# Patient Record
Sex: MALE | Race: OTHER | HISPANIC OR LATINO
[De-identification: names, ages, dates, MRNs, and addresses within clinical notes are randomized per-mention and may not be internally consistent; named-entity substitution may affect disease eponyms.]

---

## 2019-01-01 ENCOUNTER — APPOINTMENT (OUTPATIENT)
Dept: SURGERY | Facility: CLINIC | Age: 49
End: 2019-01-01
Payer: COMMERCIAL

## 2019-01-01 ENCOUNTER — APPOINTMENT (OUTPATIENT)
Dept: TRAUMA SURGERY | Facility: CLINIC | Age: 49
End: 2019-01-01
Payer: COMMERCIAL

## 2019-01-01 ENCOUNTER — OUTPATIENT (OUTPATIENT)
Dept: OUTPATIENT SERVICES | Facility: HOSPITAL | Age: 49
LOS: 1 days | End: 2019-01-01
Payer: COMMERCIAL

## 2019-01-01 ENCOUNTER — TRANSCRIPTION ENCOUNTER (OUTPATIENT)
Age: 49
End: 2019-01-01

## 2019-01-01 ENCOUNTER — APPOINTMENT (OUTPATIENT)
Dept: CT IMAGING | Facility: CLINIC | Age: 49
End: 2019-01-01
Payer: COMMERCIAL

## 2019-01-01 VITALS
OXYGEN SATURATION: 97 % | HEART RATE: 74 BPM | HEIGHT: 67 IN | WEIGHT: 190.92 LBS | RESPIRATION RATE: 16 BRPM | TEMPERATURE: 98 F | SYSTOLIC BLOOD PRESSURE: 127 MMHG | DIASTOLIC BLOOD PRESSURE: 76 MMHG

## 2019-01-01 VITALS
BODY MASS INDEX: 29.1 KG/M2 | DIASTOLIC BLOOD PRESSURE: 74 MMHG | OXYGEN SATURATION: 95 % | SYSTOLIC BLOOD PRESSURE: 128 MMHG | TEMPERATURE: 97.9 F | HEART RATE: 76 BPM | HEIGHT: 68 IN | RESPIRATION RATE: 18 BRPM | WEIGHT: 192 LBS

## 2019-01-01 DIAGNOSIS — Z00.8 ENCOUNTER FOR OTHER GENERAL EXAMINATION: ICD-10-CM

## 2019-01-01 DIAGNOSIS — Z98.890 OTHER SPECIFIED POSTPROCEDURAL STATES: Chronic | ICD-10-CM

## 2019-01-01 DIAGNOSIS — K43.9 VENTRAL HERNIA W/OUT OBSTRUCTION OR GANGRENE: ICD-10-CM

## 2019-01-01 DIAGNOSIS — K42.9 UMBILICAL HERNIA WITHOUT OBSTRUCTION OR GANGRENE: ICD-10-CM

## 2019-01-01 DIAGNOSIS — Z01.818 ENCOUNTER FOR OTHER PREPROCEDURAL EXAMINATION: ICD-10-CM

## 2019-01-01 LAB
HCT VFR BLD CALC: 44.2 % — SIGNIFICANT CHANGE UP (ref 39–50)
HGB BLD-MCNC: 14.8 G/DL — SIGNIFICANT CHANGE UP (ref 13–17)
MCHC RBC-ENTMCNC: 29.2 PG — SIGNIFICANT CHANGE UP (ref 27–34)
MCHC RBC-ENTMCNC: 33.5 GM/DL — SIGNIFICANT CHANGE UP (ref 32–36)
MCV RBC AUTO: 87.4 FL — SIGNIFICANT CHANGE UP (ref 80–100)
PLATELET # BLD AUTO: 226 K/UL — SIGNIFICANT CHANGE UP (ref 150–400)
RBC # BLD: 5.06 M/UL — SIGNIFICANT CHANGE UP (ref 4.2–5.8)
RBC # FLD: 12.6 % — SIGNIFICANT CHANGE UP (ref 10.3–14.5)
WBC # BLD: 6.1 K/UL — SIGNIFICANT CHANGE UP (ref 3.8–10.5)
WBC # FLD AUTO: 6.1 K/UL — SIGNIFICANT CHANGE UP (ref 3.8–10.5)

## 2019-01-01 PROCEDURE — 85027 COMPLETE CBC AUTOMATED: CPT

## 2019-01-01 PROCEDURE — 99201 OFFICE OUTPATIENT NEW 10 MINUTES: CPT

## 2019-01-01 PROCEDURE — 99203 OFFICE O/P NEW LOW 30 MIN: CPT

## 2019-01-01 PROCEDURE — G0463: CPT

## 2019-01-01 PROCEDURE — 74177 CT ABD & PELVIS W/CONTRAST: CPT

## 2019-01-01 PROCEDURE — 74177 CT ABD & PELVIS W/CONTRAST: CPT | Mod: 26

## 2019-01-01 RX ORDER — CEFAZOLIN SODIUM 1 G
2000 VIAL (EA) INJECTION ONCE
Refills: 0 | Status: DISCONTINUED | OUTPATIENT
Start: 2020-01-01 | End: 2020-01-01

## 2019-09-23 PROBLEM — K43.9 VENTRAL HERNIA: Status: ACTIVE | Noted: 2019-01-01

## 2019-11-26 NOTE — HISTORY OF PRESENT ILLNESS
[de-identified] : 49 year old male who was initially seen at [facility] for umbilical hernias. States that over the past 9 months the hernia has been causing increasing discomfort, particularly when lifting. 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 He denies obstructive symptoms, no N/V, tolerating diet well, having normal bowel function, denies fever/chills.\par \par Patient denies significant PMHx\par Patient has B/L knee surgeries\par \par Patient underwent a CT abdomen/pelvis which demonstrates two adjacent fat-containing umbilical hernias however the neck appears to be approximately 1.8 cm in diameter.

## 2019-11-26 NOTE — PLAN
[FreeTextEntry1] : \par - I spoke with the patient regarding the findings and diagnosis of umbilical hernia\par - I offered him umbilical hernia repair with mesh, and we spoke about the indications, risks, benefits, and alternatives of the procedure, including the use of mesh\par - The patient stated that he understood and was agreeable to surgery and gave consent, and all his questions were answered\par - Patient will follow with his PCP for preoperative workup\par - Will plan for surgery in beginning of January 2020

## 2019-11-26 NOTE — PHYSICAL EXAM
[Respiratory Effort] : normal respiratory effort [Alert] : alert [Normal Rate and Rhythm] : normal rate and rhythm [Oriented to Place] : oriented to place [Oriented to Time] : oriented to time [Oriented to Person] : oriented to person [de-identified] : NCAT [Calm] : calm [de-identified] : NAD [de-identified] : No overlying skin changes at the umbilical hernia [de-identified] : Non-reducible umbilical hernia, non-TTP at hernia, non-TTP in all quadrants, no rebound/guarding

## 2019-12-31 NOTE — H&P PST ADULT - NSICDXPASTSURGICALHX_GEN_ALL_CORE_FT
PAST SURGICAL HISTORY:  H/O right knee surgery 2004 - ACL, meniscus tear    History of left knee surgery 1992 - ACL, meniscus tear

## 2019-12-31 NOTE — H&P PST ADULT - ATTENDING COMMENTS
I spoke with the patient regarding the plans to perform an umbilical hernia repair, possible mesh. We discussed the indications, risks, benefits, and alternatives of the procedure as well as the use of the mesh. The patient stated he understood and gave consent for the procedure, and all his questions were answered.

## 2019-12-31 NOTE — H&P PST ADULT - HISTORY OF PRESENT ILLNESS
48 yo male with umbilical hernia and c/o intermittent abdominal pain presents for Open Umbilical Hernia Repair on 1/8/20.

## 2019-12-31 NOTE — H&P PST ADULT - GASTROINTESTINAL COMMENTS
umbilical hernia, occasionally painfully umbilical hernia - occasionally causes pain umbilical hernia

## 2019-12-31 NOTE — H&P PST ADULT - NS SC CAGE ALCOHOL CUT DOWN
pt presents to ED with episode of blue lips at . pt had no difficulty breathing as per mother. breathing si even and unlabored. pt acting like self as per mother. afebrile. no nasal flaring noted.; pt awake and alert
no

## 2020-01-01 ENCOUNTER — APPOINTMENT (OUTPATIENT)
Dept: SURGERY | Facility: CLINIC | Age: 50
End: 2020-01-01
Payer: COMMERCIAL

## 2020-01-01 ENCOUNTER — APPOINTMENT (OUTPATIENT)
Dept: SURGERY | Facility: HOSPITAL | Age: 50
End: 2020-01-01

## 2020-01-01 ENCOUNTER — TRANSCRIPTION ENCOUNTER (OUTPATIENT)
Age: 50
End: 2020-01-01

## 2020-01-01 ENCOUNTER — INPATIENT (INPATIENT)
Facility: HOSPITAL | Age: 50
LOS: 9 days | DRG: 871 | End: 2020-04-07
Attending: INTERNAL MEDICINE | Admitting: HOSPITALIST
Payer: COMMERCIAL

## 2020-01-01 ENCOUNTER — OUTPATIENT (OUTPATIENT)
Dept: OUTPATIENT SERVICES | Facility: HOSPITAL | Age: 50
LOS: 1 days | End: 2020-01-01
Payer: COMMERCIAL

## 2020-01-01 ENCOUNTER — RESULT REVIEW (OUTPATIENT)
Age: 50
End: 2020-01-01

## 2020-01-01 VITALS
BODY MASS INDEX: 30.13 KG/M2 | SYSTOLIC BLOOD PRESSURE: 118 MMHG | OXYGEN SATURATION: 98 % | DIASTOLIC BLOOD PRESSURE: 70 MMHG | HEART RATE: 78 BPM | WEIGHT: 192 LBS | HEIGHT: 67 IN

## 2020-01-01 VITALS
RESPIRATION RATE: 16 BRPM | DIASTOLIC BLOOD PRESSURE: 73 MMHG | HEART RATE: 69 BPM | TEMPERATURE: 97 F | SYSTOLIC BLOOD PRESSURE: 109 MMHG

## 2020-01-01 VITALS
HEIGHT: 67 IN | OXYGEN SATURATION: 98 % | DIASTOLIC BLOOD PRESSURE: 66 MMHG | WEIGHT: 190.92 LBS | TEMPERATURE: 98 F | RESPIRATION RATE: 16 BRPM | HEART RATE: 76 BPM | SYSTOLIC BLOOD PRESSURE: 119 MMHG

## 2020-01-01 VITALS
HEIGHT: 67 IN | OXYGEN SATURATION: 90 % | RESPIRATION RATE: 28 BRPM | HEART RATE: 110 BPM | DIASTOLIC BLOOD PRESSURE: 73 MMHG | SYSTOLIC BLOOD PRESSURE: 136 MMHG | TEMPERATURE: 103 F | WEIGHT: 179.9 LBS

## 2020-01-01 VITALS — OXYGEN SATURATION: 67 % | RESPIRATION RATE: 34 BRPM

## 2020-01-01 DIAGNOSIS — Z98.890 OTHER SPECIFIED POSTPROCEDURAL STATES: Chronic | ICD-10-CM

## 2020-01-01 DIAGNOSIS — B34.2 CORONAVIRUS INFECTION, UNSPECIFIED: ICD-10-CM

## 2020-01-01 DIAGNOSIS — K42.9 UMBILICAL HERNIA WITHOUT OBSTRUCTION OR GANGRENE: ICD-10-CM

## 2020-01-01 LAB
4/8 RATIO: 1.75 RATIO — SIGNIFICANT CHANGE UP (ref 0.9–3.6)
ABS CD8: 206 /UL — SIGNIFICANT CHANGE UP (ref 142–740)
ALBUMIN SERPL ELPH-MCNC: 3.3 G/DL — SIGNIFICANT CHANGE UP (ref 3.3–5)
ALBUMIN SERPL ELPH-MCNC: 3.4 G/DL — SIGNIFICANT CHANGE UP (ref 3.3–5)
ALBUMIN SERPL ELPH-MCNC: 3.5 G/DL — SIGNIFICANT CHANGE UP (ref 3.3–5)
ALBUMIN SERPL ELPH-MCNC: 3.6 G/DL — SIGNIFICANT CHANGE UP (ref 3.3–5)
ALBUMIN SERPL ELPH-MCNC: 4 G/DL — SIGNIFICANT CHANGE UP (ref 3.3–5)
ALBUMIN SERPL ELPH-MCNC: 4.1 G/DL — SIGNIFICANT CHANGE UP (ref 3.3–5)
ALP SERPL-CCNC: 169 U/L — HIGH (ref 40–120)
ALP SERPL-CCNC: 170 U/L — HIGH (ref 40–120)
ALP SERPL-CCNC: 192 U/L — HIGH (ref 40–120)
ALP SERPL-CCNC: 194 U/L — HIGH (ref 40–120)
ALP SERPL-CCNC: 227 U/L — HIGH (ref 40–120)
ALP SERPL-CCNC: 298 U/L — HIGH (ref 40–120)
ALT FLD-CCNC: 171 U/L — HIGH (ref 10–45)
ALT FLD-CCNC: 227 U/L — HIGH (ref 10–45)
ALT FLD-CCNC: 229 U/L — HIGH (ref 10–45)
ALT FLD-CCNC: 246 U/L — HIGH (ref 10–45)
ALT FLD-CCNC: 270 U/L — HIGH (ref 10–45)
ALT FLD-CCNC: 289 U/L — HIGH (ref 10–45)
AMMONIA BLD-MCNC: 72 UMOL/L — HIGH (ref 11–55)
ANION GAP SERPL CALC-SCNC: 12 MMOL/L — SIGNIFICANT CHANGE UP (ref 5–17)
ANION GAP SERPL CALC-SCNC: 12 MMOL/L — SIGNIFICANT CHANGE UP (ref 5–17)
ANION GAP SERPL CALC-SCNC: 14 MMOL/L — SIGNIFICANT CHANGE UP (ref 5–17)
ANION GAP SERPL CALC-SCNC: 14 MMOL/L — SIGNIFICANT CHANGE UP (ref 5–17)
ANION GAP SERPL CALC-SCNC: 17 MMOL/L — SIGNIFICANT CHANGE UP (ref 5–17)
ANION GAP SERPL CALC-SCNC: 19 MMOL/L — HIGH (ref 5–17)
APPEARANCE UR: CLEAR — SIGNIFICANT CHANGE UP
APTT BLD: 22.6 SEC — LOW (ref 27.5–36.3)
APTT BLD: 32.8 SEC — SIGNIFICANT CHANGE UP (ref 27.5–36.3)
AST SERPL-CCNC: 165 U/L — HIGH (ref 10–40)
AST SERPL-CCNC: 186 U/L — HIGH (ref 10–40)
AST SERPL-CCNC: 237 U/L — HIGH (ref 10–40)
AST SERPL-CCNC: 276 U/L — HIGH (ref 10–40)
AST SERPL-CCNC: 61 U/L — HIGH (ref 10–40)
AST SERPL-CCNC: 98 U/L — HIGH (ref 10–40)
BACTERIA # UR AUTO: NEGATIVE — SIGNIFICANT CHANGE UP
BASE EXCESS BLDA CALC-SCNC: 6.2 MMOL/L — HIGH (ref -2–2)
BASE EXCESS BLDA CALC-SCNC: 7.3 MMOL/L — HIGH (ref -2–2)
BASE EXCESS BLDV CALC-SCNC: 2.9 MMOL/L — HIGH (ref -2–2)
BASOPHILS # BLD AUTO: 0.01 K/UL — SIGNIFICANT CHANGE UP (ref 0–0.2)
BASOPHILS # BLD AUTO: 0.03 K/UL — SIGNIFICANT CHANGE UP (ref 0–0.2)
BASOPHILS NFR BLD AUTO: 0.1 % — SIGNIFICANT CHANGE UP (ref 0–2)
BASOPHILS NFR BLD AUTO: 0.2 % — SIGNIFICANT CHANGE UP (ref 0–2)
BASOPHILS NFR BLD AUTO: 0.2 % — SIGNIFICANT CHANGE UP (ref 0–2)
BASOPHILS NFR BLD AUTO: 0.3 % — SIGNIFICANT CHANGE UP (ref 0–2)
BILIRUB SERPL-MCNC: 0.4 MG/DL — SIGNIFICANT CHANGE UP (ref 0.2–1.2)
BILIRUB SERPL-MCNC: 0.6 MG/DL — SIGNIFICANT CHANGE UP (ref 0.2–1.2)
BILIRUB SERPL-MCNC: 0.7 MG/DL — SIGNIFICANT CHANGE UP (ref 0.2–1.2)
BILIRUB SERPL-MCNC: 0.8 MG/DL — SIGNIFICANT CHANGE UP (ref 0.2–1.2)
BILIRUB UR-MCNC: NEGATIVE — SIGNIFICANT CHANGE UP
BUN SERPL-MCNC: 16 MG/DL — SIGNIFICANT CHANGE UP (ref 7–23)
BUN SERPL-MCNC: 16 MG/DL — SIGNIFICANT CHANGE UP (ref 7–23)
BUN SERPL-MCNC: 25 MG/DL — HIGH (ref 7–23)
BUN SERPL-MCNC: 6 MG/DL — LOW (ref 7–23)
BUN SERPL-MCNC: 9 MG/DL — SIGNIFICANT CHANGE UP (ref 7–23)
BUN SERPL-MCNC: 9 MG/DL — SIGNIFICANT CHANGE UP (ref 7–23)
CA-I SERPL-SCNC: 1.12 MMOL/L — SIGNIFICANT CHANGE UP (ref 1.12–1.3)
CALCIUM SERPL-MCNC: 8.3 MG/DL — LOW (ref 8.4–10.5)
CALCIUM SERPL-MCNC: 8.4 MG/DL — SIGNIFICANT CHANGE UP (ref 8.4–10.5)
CALCIUM SERPL-MCNC: 8.4 MG/DL — SIGNIFICANT CHANGE UP (ref 8.4–10.5)
CALCIUM SERPL-MCNC: 8.7 MG/DL — SIGNIFICANT CHANGE UP (ref 8.4–10.5)
CALCIUM SERPL-MCNC: 9.2 MG/DL — SIGNIFICANT CHANGE UP (ref 8.4–10.5)
CALCIUM SERPL-MCNC: 9.2 MG/DL — SIGNIFICANT CHANGE UP (ref 8.4–10.5)
CD3 BLASTS SPEC-ACNC: 46 % — LOW (ref 59–83)
CD3 BLASTS SPEC-ACNC: 574 /UL — LOW (ref 672–1870)
CD4 %: 29 % — LOW (ref 30–62)
CD8 %: 16 % — SIGNIFICANT CHANGE UP (ref 12–36)
CHLORIDE BLDV-SCNC: 99 MMOL/L — SIGNIFICANT CHANGE UP (ref 96–108)
CHLORIDE SERPL-SCNC: 104 MMOL/L — SIGNIFICANT CHANGE UP (ref 96–108)
CHLORIDE SERPL-SCNC: 89 MMOL/L — LOW (ref 96–108)
CHLORIDE SERPL-SCNC: 90 MMOL/L — LOW (ref 96–108)
CHLORIDE SERPL-SCNC: 95 MMOL/L — LOW (ref 96–108)
CHLORIDE SERPL-SCNC: 95 MMOL/L — LOW (ref 96–108)
CHLORIDE SERPL-SCNC: 99 MMOL/L — SIGNIFICANT CHANGE UP (ref 96–108)
CK MB BLD-MCNC: 2 % — SIGNIFICANT CHANGE UP (ref 0–3.5)
CK MB CFR SERPL CALC: 2.6 NG/ML — SIGNIFICANT CHANGE UP (ref 0–6.7)
CK MB CFR SERPL CALC: 8 NG/ML — HIGH (ref 0–6.7)
CK SERPL-CCNC: 127 U/L — SIGNIFICANT CHANGE UP (ref 30–200)
CK SERPL-CCNC: 513 U/L — HIGH (ref 30–200)
CO2 BLDA-SCNC: 33 MMOL/L — HIGH (ref 22–30)
CO2 BLDA-SCNC: 35 MMOL/L — HIGH (ref 22–30)
CO2 BLDV-SCNC: 29 MMOL/L — SIGNIFICANT CHANGE UP (ref 22–30)
CO2 SERPL-SCNC: 24 MMOL/L — SIGNIFICANT CHANGE UP (ref 22–31)
CO2 SERPL-SCNC: 24 MMOL/L — SIGNIFICANT CHANGE UP (ref 22–31)
CO2 SERPL-SCNC: 25 MMOL/L — SIGNIFICANT CHANGE UP (ref 22–31)
CO2 SERPL-SCNC: 25 MMOL/L — SIGNIFICANT CHANGE UP (ref 22–31)
CO2 SERPL-SCNC: 28 MMOL/L — SIGNIFICANT CHANGE UP (ref 22–31)
CO2 SERPL-SCNC: 31 MMOL/L — SIGNIFICANT CHANGE UP (ref 22–31)
COLOR SPEC: SIGNIFICANT CHANGE UP
CREAT SERPL-MCNC: 0.62 MG/DL — SIGNIFICANT CHANGE UP (ref 0.5–1.3)
CREAT SERPL-MCNC: 0.78 MG/DL — SIGNIFICANT CHANGE UP (ref 0.5–1.3)
CREAT SERPL-MCNC: 0.79 MG/DL — SIGNIFICANT CHANGE UP (ref 0.5–1.3)
CREAT SERPL-MCNC: 0.82 MG/DL — SIGNIFICANT CHANGE UP (ref 0.5–1.3)
CREAT SERPL-MCNC: 0.83 MG/DL — SIGNIFICANT CHANGE UP (ref 0.5–1.3)
CREAT SERPL-MCNC: 0.93 MG/DL — SIGNIFICANT CHANGE UP (ref 0.5–1.3)
CRP SERPL-MCNC: 10.68 MG/DL — HIGH (ref 0–0.4)
CRP SERPL-MCNC: 16.32 MG/DL — HIGH (ref 0–0.4)
CRP SERPL-MCNC: 6.71 MG/DL — HIGH (ref 0–0.4)
CRP SERPL-MCNC: 7.19 MG/DL — HIGH (ref 0–0.4)
CULTURE RESULTS: NO GROWTH — SIGNIFICANT CHANGE UP
CULTURE RESULTS: SIGNIFICANT CHANGE UP
CULTURE RESULTS: SIGNIFICANT CHANGE UP
D DIMER BLD IA.RAPID-MCNC: 3207 NG/ML DDU — HIGH
D DIMER BLD IA.RAPID-MCNC: <150 NG/ML DDU — SIGNIFICANT CHANGE UP
D DIMER BLD IA.RAPID-MCNC: HIGH NG/ML DDU
D DIMER BLD IA.RAPID-MCNC: HIGH NG/ML DDU
DIFF PNL FLD: NEGATIVE — SIGNIFICANT CHANGE UP
EOSINOPHIL # BLD AUTO: 0 K/UL — SIGNIFICANT CHANGE UP (ref 0–0.5)
EOSINOPHIL # BLD AUTO: 0.01 K/UL — SIGNIFICANT CHANGE UP (ref 0–0.5)
EOSINOPHIL NFR BLD AUTO: 0 % — SIGNIFICANT CHANGE UP (ref 0–6)
EOSINOPHIL NFR BLD AUTO: 0.1 % — SIGNIFICANT CHANGE UP (ref 0–6)
EPI CELLS # UR: 0 /HPF — SIGNIFICANT CHANGE UP
ERYTHROCYTE [SEDIMENTATION RATE] IN BLOOD: 34 MM/HR — HIGH (ref 0–15)
FERRITIN SERPL-MCNC: 6548 NG/ML — HIGH (ref 30–400)
FERRITIN SERPL-MCNC: 8098 NG/ML — HIGH (ref 30–400)
FIBRINOGEN PPP-MCNC: 391 MG/DL — SIGNIFICANT CHANGE UP (ref 350–510)
FIBRINOGEN PPP-MCNC: 440 MG/DL — SIGNIFICANT CHANGE UP (ref 350–510)
FIBRINOGEN PPP-MCNC: 626 MG/DL — HIGH (ref 350–510)
FLU A RESULT: SIGNIFICANT CHANGE UP
FLU A RESULT: SIGNIFICANT CHANGE UP
FLUAV AG NPH QL: SIGNIFICANT CHANGE UP
FLUBV AG NPH QL: SIGNIFICANT CHANGE UP
G6PD RBC-CCNC: 11.9 U/G HGB — SIGNIFICANT CHANGE UP (ref 7–20.5)
GAS PNL BLDA: SIGNIFICANT CHANGE UP
GAS PNL BLDV: 129 MMOL/L — LOW (ref 135–145)
GAS PNL BLDV: SIGNIFICANT CHANGE UP
GLUCOSE BLDC GLUCOMTR-MCNC: 143 MG/DL — HIGH (ref 70–99)
GLUCOSE BLDC GLUCOMTR-MCNC: 195 MG/DL — HIGH (ref 70–99)
GLUCOSE BLDV-MCNC: 142 MG/DL — HIGH (ref 70–99)
GLUCOSE SERPL-MCNC: 114 MG/DL — HIGH (ref 70–99)
GLUCOSE SERPL-MCNC: 121 MG/DL — HIGH (ref 70–99)
GLUCOSE SERPL-MCNC: 137 MG/DL — HIGH (ref 70–99)
GLUCOSE SERPL-MCNC: 142 MG/DL — HIGH (ref 70–99)
GLUCOSE SERPL-MCNC: 152 MG/DL — HIGH (ref 70–99)
GLUCOSE SERPL-MCNC: 186 MG/DL — HIGH (ref 70–99)
GLUCOSE UR QL: NEGATIVE — SIGNIFICANT CHANGE UP
HCO3 BLDA-SCNC: 32 MMOL/L — HIGH (ref 21–29)
HCO3 BLDA-SCNC: 34 MMOL/L — HIGH (ref 21–29)
HCO3 BLDV-SCNC: 27 MMOL/L — SIGNIFICANT CHANGE UP (ref 21–29)
HCT VFR BLD CALC: 38.8 % — LOW (ref 39–50)
HCT VFR BLD CALC: 39.4 % — SIGNIFICANT CHANGE UP (ref 39–50)
HCT VFR BLD CALC: 40.1 % — SIGNIFICANT CHANGE UP (ref 39–50)
HCT VFR BLD CALC: 41.6 % — SIGNIFICANT CHANGE UP (ref 39–50)
HCT VFR BLD CALC: 42.4 % — SIGNIFICANT CHANGE UP (ref 39–50)
HCT VFR BLD CALC: 42.7 % — SIGNIFICANT CHANGE UP (ref 39–50)
HCT VFR BLDA CALC: 44 % — SIGNIFICANT CHANGE UP (ref 39–50)
HGB BLD CALC-MCNC: 14.2 G/DL — SIGNIFICANT CHANGE UP (ref 13–17)
HGB BLD-MCNC: 12.6 G/DL — LOW (ref 13–17)
HGB BLD-MCNC: 13.3 G/DL — SIGNIFICANT CHANGE UP (ref 13–17)
HGB BLD-MCNC: 13.5 G/DL — SIGNIFICANT CHANGE UP (ref 13–17)
HGB BLD-MCNC: 13.7 G/DL — SIGNIFICANT CHANGE UP (ref 13–17)
HGB BLD-MCNC: 13.9 G/DL — SIGNIFICANT CHANGE UP (ref 13–17)
HGB BLD-MCNC: 14.1 G/DL — SIGNIFICANT CHANGE UP (ref 13–17)
IMM GRANULOCYTES NFR BLD AUTO: 0.4 % — SIGNIFICANT CHANGE UP (ref 0–1.5)
IMM GRANULOCYTES NFR BLD AUTO: 0.8 % — SIGNIFICANT CHANGE UP (ref 0–1.5)
IMM GRANULOCYTES NFR BLD AUTO: 1.5 % — SIGNIFICANT CHANGE UP (ref 0–1.5)
IMM GRANULOCYTES NFR BLD AUTO: 1.9 % — HIGH (ref 0–1.5)
INR BLD: 1.08 RATIO — SIGNIFICANT CHANGE UP (ref 0.88–1.16)
INR BLD: 1.08 RATIO — SIGNIFICANT CHANGE UP (ref 0.88–1.16)
INR BLD: 1.14 RATIO — SIGNIFICANT CHANGE UP (ref 0.88–1.16)
KETONES UR-MCNC: NEGATIVE — SIGNIFICANT CHANGE UP
LACTATE BLDV-MCNC: 1.7 MMOL/L — SIGNIFICANT CHANGE UP (ref 0.7–2)
LDH SERPL L TO P-CCNC: 1096 U/L — HIGH (ref 50–242)
LDH SERPL L TO P-CCNC: 1219 U/L — HIGH (ref 50–242)
LDH SERPL L TO P-CCNC: 582 U/L — HIGH (ref 50–242)
LDH SERPL L TO P-CCNC: 714 U/L — HIGH (ref 50–242)
LDH SERPL L TO P-CCNC: 785 U/L — HIGH (ref 50–242)
LEGIONELLA AG UR QL: NEGATIVE — SIGNIFICANT CHANGE UP
LEUKOCYTE ESTERASE UR-ACNC: NEGATIVE — SIGNIFICANT CHANGE UP
LYMPHOCYTES # BLD AUTO: 0.67 K/UL — LOW (ref 1–3.3)
LYMPHOCYTES # BLD AUTO: 0.74 K/UL — LOW (ref 1–3.3)
LYMPHOCYTES # BLD AUTO: 0.98 K/UL — LOW (ref 1–3.3)
LYMPHOCYTES # BLD AUTO: 1.15 K/UL — SIGNIFICANT CHANGE UP (ref 1–3.3)
LYMPHOCYTES # BLD AUTO: 10.5 % — LOW (ref 13–44)
LYMPHOCYTES # BLD AUTO: 15.5 % — SIGNIFICANT CHANGE UP (ref 13–44)
LYMPHOCYTES # BLD AUTO: 23.4 % — SIGNIFICANT CHANGE UP (ref 13–44)
LYMPHOCYTES # BLD AUTO: 6.3 % — LOW (ref 13–44)
MAGNESIUM SERPL-MCNC: 2.6 MG/DL — SIGNIFICANT CHANGE UP (ref 1.6–2.6)
MAGNESIUM SERPL-MCNC: 3.1 MG/DL — HIGH (ref 1.6–2.6)
MAGNESIUM SERPL-MCNC: 3.2 MG/DL — HIGH (ref 1.6–2.6)
MCHC RBC-ENTMCNC: 28.1 PG — SIGNIFICANT CHANGE UP (ref 27–34)
MCHC RBC-ENTMCNC: 28.2 PG — SIGNIFICANT CHANGE UP (ref 27–34)
MCHC RBC-ENTMCNC: 28.3 PG — SIGNIFICANT CHANGE UP (ref 27–34)
MCHC RBC-ENTMCNC: 28.6 PG — SIGNIFICANT CHANGE UP (ref 27–34)
MCHC RBC-ENTMCNC: 28.7 PG — SIGNIFICANT CHANGE UP (ref 27–34)
MCHC RBC-ENTMCNC: 29 PG — SIGNIFICANT CHANGE UP (ref 27–34)
MCHC RBC-ENTMCNC: 32.3 GM/DL — SIGNIFICANT CHANGE UP (ref 32–36)
MCHC RBC-ENTMCNC: 32.5 GM/DL — SIGNIFICANT CHANGE UP (ref 32–36)
MCHC RBC-ENTMCNC: 33 GM/DL — SIGNIFICANT CHANGE UP (ref 32–36)
MCHC RBC-ENTMCNC: 33.4 GM/DL — SIGNIFICANT CHANGE UP (ref 32–36)
MCHC RBC-ENTMCNC: 33.7 GM/DL — SIGNIFICANT CHANGE UP (ref 32–36)
MCHC RBC-ENTMCNC: 33.8 GM/DL — SIGNIFICANT CHANGE UP (ref 32–36)
MCV RBC AUTO: 83.7 FL — SIGNIFICANT CHANGE UP (ref 80–100)
MCV RBC AUTO: 84.2 FL — SIGNIFICANT CHANGE UP (ref 80–100)
MCV RBC AUTO: 85.1 FL — SIGNIFICANT CHANGE UP (ref 80–100)
MCV RBC AUTO: 85.7 FL — SIGNIFICANT CHANGE UP (ref 80–100)
MCV RBC AUTO: 88.2 FL — SIGNIFICANT CHANGE UP (ref 80–100)
MCV RBC AUTO: 89.6 FL — SIGNIFICANT CHANGE UP (ref 80–100)
MONOCYTES # BLD AUTO: 0.16 K/UL — SIGNIFICANT CHANGE UP (ref 0–0.9)
MONOCYTES # BLD AUTO: 0.33 K/UL — SIGNIFICANT CHANGE UP (ref 0–0.9)
MONOCYTES # BLD AUTO: 0.34 K/UL — SIGNIFICANT CHANGE UP (ref 0–0.9)
MONOCYTES # BLD AUTO: 0.46 K/UL — SIGNIFICANT CHANGE UP (ref 0–0.9)
MONOCYTES NFR BLD AUTO: 3.3 % — SIGNIFICANT CHANGE UP (ref 2–14)
MONOCYTES NFR BLD AUTO: 4.4 % — SIGNIFICANT CHANGE UP (ref 2–14)
MONOCYTES NFR BLD AUTO: 4.8 % — SIGNIFICANT CHANGE UP (ref 2–14)
MONOCYTES NFR BLD AUTO: 5.2 % — SIGNIFICANT CHANGE UP (ref 2–14)
NEUTROPHILS # BLD AUTO: 3.57 K/UL — SIGNIFICANT CHANGE UP (ref 1.8–7.4)
NEUTROPHILS # BLD AUTO: 4.95 K/UL — SIGNIFICANT CHANGE UP (ref 1.8–7.4)
NEUTROPHILS # BLD AUTO: 5.8 K/UL — SIGNIFICANT CHANGE UP (ref 1.8–7.4)
NEUTROPHILS # BLD AUTO: 9.24 K/UL — HIGH (ref 1.8–7.4)
NEUTROPHILS NFR BLD AUTO: 72.7 % — SIGNIFICANT CHANGE UP (ref 43–77)
NEUTROPHILS NFR BLD AUTO: 78.3 % — HIGH (ref 43–77)
NEUTROPHILS NFR BLD AUTO: 82.7 % — HIGH (ref 43–77)
NEUTROPHILS NFR BLD AUTO: 87.4 % — HIGH (ref 43–77)
NITRITE UR-MCNC: NEGATIVE — SIGNIFICANT CHANGE UP
NRBC # BLD: 0 /100 WBCS — SIGNIFICANT CHANGE UP (ref 0–0)
PCO2 BLDA: 49 MMHG — HIGH (ref 32–46)
PCO2 BLDA: 56 MMHG — HIGH (ref 32–46)
PCO2 BLDV: 43 MMHG — SIGNIFICANT CHANGE UP (ref 35–50)
PH BLDA: 7.4 — SIGNIFICANT CHANGE UP (ref 7.35–7.45)
PH BLDA: 7.42 — SIGNIFICANT CHANGE UP (ref 7.35–7.45)
PH BLDV: 7.42 — SIGNIFICANT CHANGE UP (ref 7.35–7.45)
PH UR: 7 — SIGNIFICANT CHANGE UP (ref 5–8)
PHOSPHATE SERPL-MCNC: 2.1 MG/DL — LOW (ref 2.5–4.5)
PHOSPHATE SERPL-MCNC: 3.6 MG/DL — SIGNIFICANT CHANGE UP (ref 2.5–4.5)
PHOSPHATE SERPL-MCNC: 4.3 MG/DL — SIGNIFICANT CHANGE UP (ref 2.5–4.5)
PLATELET # BLD AUTO: 141 K/UL — LOW (ref 150–400)
PLATELET # BLD AUTO: 156 K/UL — SIGNIFICANT CHANGE UP (ref 150–400)
PLATELET # BLD AUTO: 162 K/UL — SIGNIFICANT CHANGE UP (ref 150–400)
PLATELET # BLD AUTO: 198 K/UL — SIGNIFICANT CHANGE UP (ref 150–400)
PLATELET # BLD AUTO: 249 K/UL — SIGNIFICANT CHANGE UP (ref 150–400)
PLATELET # BLD AUTO: 366 K/UL — SIGNIFICANT CHANGE UP (ref 150–400)
PO2 BLDA: 118 MMHG — HIGH (ref 74–108)
PO2 BLDA: 134 MMHG — HIGH (ref 74–108)
PO2 BLDV: 34 MMHG — SIGNIFICANT CHANGE UP (ref 25–45)
POTASSIUM BLDV-SCNC: 3.1 MMOL/L — LOW (ref 3.5–5.3)
POTASSIUM SERPL-MCNC: 3.3 MMOL/L — LOW (ref 3.5–5.3)
POTASSIUM SERPL-MCNC: 3.4 MMOL/L — LOW (ref 3.5–5.3)
POTASSIUM SERPL-MCNC: 3.7 MMOL/L — SIGNIFICANT CHANGE UP (ref 3.5–5.3)
POTASSIUM SERPL-MCNC: 3.8 MMOL/L — SIGNIFICANT CHANGE UP (ref 3.5–5.3)
POTASSIUM SERPL-MCNC: 5 MMOL/L — SIGNIFICANT CHANGE UP (ref 3.5–5.3)
POTASSIUM SERPL-MCNC: 5.3 MMOL/L — SIGNIFICANT CHANGE UP (ref 3.5–5.3)
POTASSIUM SERPL-SCNC: 3.3 MMOL/L — LOW (ref 3.5–5.3)
POTASSIUM SERPL-SCNC: 3.4 MMOL/L — LOW (ref 3.5–5.3)
POTASSIUM SERPL-SCNC: 3.7 MMOL/L — SIGNIFICANT CHANGE UP (ref 3.5–5.3)
POTASSIUM SERPL-SCNC: 3.8 MMOL/L — SIGNIFICANT CHANGE UP (ref 3.5–5.3)
POTASSIUM SERPL-SCNC: 5 MMOL/L — SIGNIFICANT CHANGE UP (ref 3.5–5.3)
POTASSIUM SERPL-SCNC: 5.3 MMOL/L — SIGNIFICANT CHANGE UP (ref 3.5–5.3)
PROCALCITONIN SERPL-MCNC: 0.14 NG/ML — HIGH (ref 0.02–0.1)
PROCALCITONIN SERPL-MCNC: 0.15 NG/ML — HIGH (ref 0.02–0.1)
PROCALCITONIN SERPL-MCNC: 0.19 NG/ML — HIGH (ref 0.02–0.1)
PROCALCITONIN SERPL-MCNC: 0.33 NG/ML — HIGH (ref 0.02–0.1)
PROCALCITONIN SERPL-MCNC: 0.34 NG/ML — HIGH (ref 0.02–0.1)
PROT SERPL-MCNC: 6.8 G/DL — SIGNIFICANT CHANGE UP (ref 6–8.3)
PROT SERPL-MCNC: 6.9 G/DL — SIGNIFICANT CHANGE UP (ref 6–8.3)
PROT SERPL-MCNC: 7.4 G/DL — SIGNIFICANT CHANGE UP (ref 6–8.3)
PROT SERPL-MCNC: 7.5 G/DL — SIGNIFICANT CHANGE UP (ref 6–8.3)
PROT SERPL-MCNC: 7.8 G/DL — SIGNIFICANT CHANGE UP (ref 6–8.3)
PROT SERPL-MCNC: 7.9 G/DL — SIGNIFICANT CHANGE UP (ref 6–8.3)
PROT UR-MCNC: ABNORMAL
PROTHROM AB SERPL-ACNC: 12.5 SEC — SIGNIFICANT CHANGE UP (ref 10–12.9)
PROTHROM AB SERPL-ACNC: 12.5 SEC — SIGNIFICANT CHANGE UP (ref 10–12.9)
PROTHROM AB SERPL-ACNC: 13.1 SEC — HIGH (ref 10–12.9)
RBC # BLD: 4.4 M/UL — SIGNIFICANT CHANGE UP (ref 4.2–5.8)
RBC # BLD: 4.63 M/UL — SIGNIFICANT CHANGE UP (ref 4.2–5.8)
RBC # BLD: 4.73 M/UL — SIGNIFICANT CHANGE UP (ref 4.2–5.8)
RBC # BLD: 4.79 M/UL — SIGNIFICANT CHANGE UP (ref 4.2–5.8)
RBC # BLD: 4.94 M/UL — SIGNIFICANT CHANGE UP (ref 4.2–5.8)
RBC # BLD: 4.98 M/UL — SIGNIFICANT CHANGE UP (ref 4.2–5.8)
RBC # FLD: 12 % — SIGNIFICANT CHANGE UP (ref 10.3–14.5)
RBC # FLD: 12.1 % — SIGNIFICANT CHANGE UP (ref 10.3–14.5)
RBC # FLD: 12.1 % — SIGNIFICANT CHANGE UP (ref 10.3–14.5)
RBC # FLD: 12.2 % — SIGNIFICANT CHANGE UP (ref 10.3–14.5)
RBC # FLD: 12.3 % — SIGNIFICANT CHANGE UP (ref 10.3–14.5)
RBC # FLD: 12.3 % — SIGNIFICANT CHANGE UP (ref 10.3–14.5)
RBC CASTS # UR COMP ASSIST: 0 /HPF — SIGNIFICANT CHANGE UP (ref 0–4)
RSV RESULT: SIGNIFICANT CHANGE UP
RSV RNA RESP QL NAA+PROBE: SIGNIFICANT CHANGE UP
SAO2 % BLDA: 98 % — HIGH (ref 92–96)
SAO2 % BLDA: 98 % — HIGH (ref 92–96)
SAO2 % BLDV: 65 % — LOW (ref 67–88)
SARS-COV-2 RNA SPEC QL NAA+PROBE: DETECTED
SODIUM SERPL-SCNC: 129 MMOL/L — LOW (ref 135–145)
SODIUM SERPL-SCNC: 131 MMOL/L — LOW (ref 135–145)
SODIUM SERPL-SCNC: 133 MMOL/L — LOW (ref 135–145)
SODIUM SERPL-SCNC: 138 MMOL/L — SIGNIFICANT CHANGE UP (ref 135–145)
SODIUM SERPL-SCNC: 139 MMOL/L — SIGNIFICANT CHANGE UP (ref 135–145)
SODIUM SERPL-SCNC: 147 MMOL/L — HIGH (ref 135–145)
SP GR SPEC: 1.01 — SIGNIFICANT CHANGE UP (ref 1.01–1.02)
SPECIMEN SOURCE: SIGNIFICANT CHANGE UP
SURGICAL PATHOLOGY STUDY: SIGNIFICANT CHANGE UP
T-CELL CD4 SUBSET PNL BLD: 360 /UL — LOW (ref 489–1457)
TROPONIN T, HIGH SENSITIVITY RESULT: 7 NG/L — SIGNIFICANT CHANGE UP (ref 0–51)
UROBILINOGEN FLD QL: NEGATIVE — SIGNIFICANT CHANGE UP
WBC # BLD: 10.57 K/UL — HIGH (ref 3.8–10.5)
WBC # BLD: 12.44 K/UL — HIGH (ref 3.8–10.5)
WBC # BLD: 16.9 K/UL — HIGH (ref 3.8–10.5)
WBC # BLD: 4.91 K/UL — SIGNIFICANT CHANGE UP (ref 3.8–10.5)
WBC # BLD: 6.32 K/UL — SIGNIFICANT CHANGE UP (ref 3.8–10.5)
WBC # BLD: 7.02 K/UL — SIGNIFICANT CHANGE UP (ref 3.8–10.5)
WBC # FLD AUTO: 10.57 K/UL — HIGH (ref 3.8–10.5)
WBC # FLD AUTO: 12.44 K/UL — HIGH (ref 3.8–10.5)
WBC # FLD AUTO: 16.9 K/UL — HIGH (ref 3.8–10.5)
WBC # FLD AUTO: 4.91 K/UL — SIGNIFICANT CHANGE UP (ref 3.8–10.5)
WBC # FLD AUTO: 6.32 K/UL — SIGNIFICANT CHANGE UP (ref 3.8–10.5)
WBC # FLD AUTO: 7.02 K/UL — SIGNIFICANT CHANGE UP (ref 3.8–10.5)
WBC UR QL: 1 /HPF — SIGNIFICANT CHANGE UP (ref 0–5)

## 2020-01-01 PROCEDURE — 99291 CRITICAL CARE FIRST HOUR: CPT

## 2020-01-01 PROCEDURE — 84132 ASSAY OF SERUM POTASSIUM: CPT

## 2020-01-01 PROCEDURE — 94640 AIRWAY INHALATION TREATMENT: CPT

## 2020-01-01 PROCEDURE — 93010 ELECTROCARDIOGRAM REPORT: CPT

## 2020-01-01 PROCEDURE — 87449 NOS EACH ORGANISM AG IA: CPT

## 2020-01-01 PROCEDURE — 85730 THROMBOPLASTIN TIME PARTIAL: CPT

## 2020-01-01 PROCEDURE — 83615 LACTATE (LD) (LDH) ENZYME: CPT

## 2020-01-01 PROCEDURE — 82565 ASSAY OF CREATININE: CPT

## 2020-01-01 PROCEDURE — 96375 TX/PRO/DX INJ NEW DRUG ADDON: CPT

## 2020-01-01 PROCEDURE — 82955 ASSAY OF G6PD ENZYME: CPT

## 2020-01-01 PROCEDURE — 70450 CT HEAD/BRAIN W/O DYE: CPT | Mod: 26

## 2020-01-01 PROCEDURE — 84295 ASSAY OF SERUM SODIUM: CPT

## 2020-01-01 PROCEDURE — 71045 X-RAY EXAM CHEST 1 VIEW: CPT | Mod: 26

## 2020-01-01 PROCEDURE — 86360 T CELL ABSOLUTE COUNT/RATIO: CPT

## 2020-01-01 PROCEDURE — 83605 ASSAY OF LACTIC ACID: CPT

## 2020-01-01 PROCEDURE — 94002 VENT MGMT INPAT INIT DAY: CPT

## 2020-01-01 PROCEDURE — 87631 RESP VIRUS 3-5 TARGETS: CPT

## 2020-01-01 PROCEDURE — 96367 TX/PROPH/DG ADDL SEQ IV INF: CPT

## 2020-01-01 PROCEDURE — 93005 ELECTROCARDIOGRAM TRACING: CPT

## 2020-01-01 PROCEDURE — 84100 ASSAY OF PHOSPHORUS: CPT

## 2020-01-01 PROCEDURE — 85652 RBC SED RATE AUTOMATED: CPT

## 2020-01-01 PROCEDURE — 82435 ASSAY OF BLOOD CHLORIDE: CPT

## 2020-01-01 PROCEDURE — 88302 TISSUE EXAM BY PATHOLOGIST: CPT | Mod: 26

## 2020-01-01 PROCEDURE — 99024 POSTOP FOLLOW-UP VISIT: CPT

## 2020-01-01 PROCEDURE — 71275 CT ANGIOGRAPHY CHEST: CPT

## 2020-01-01 PROCEDURE — 99254 IP/OBS CNSLTJ NEW/EST MOD 60: CPT

## 2020-01-01 PROCEDURE — 71275 CT ANGIOGRAPHY CHEST: CPT | Mod: 26

## 2020-01-01 PROCEDURE — 87086 URINE CULTURE/COLONY COUNT: CPT

## 2020-01-01 PROCEDURE — 82803 BLOOD GASES ANY COMBINATION: CPT

## 2020-01-01 PROCEDURE — 86140 C-REACTIVE PROTEIN: CPT

## 2020-01-01 PROCEDURE — 85384 FIBRINOGEN ACTIVITY: CPT

## 2020-01-01 PROCEDURE — 70496 CT ANGIOGRAPHY HEAD: CPT

## 2020-01-01 PROCEDURE — 84484 ASSAY OF TROPONIN QUANT: CPT

## 2020-01-01 PROCEDURE — 82947 ASSAY GLUCOSE BLOOD QUANT: CPT

## 2020-01-01 PROCEDURE — 85027 COMPLETE CBC AUTOMATED: CPT

## 2020-01-01 PROCEDURE — 70496 CT ANGIOGRAPHY HEAD: CPT | Mod: 26

## 2020-01-01 PROCEDURE — 82553 CREATINE MB FRACTION: CPT

## 2020-01-01 PROCEDURE — 87040 BLOOD CULTURE FOR BACTERIA: CPT

## 2020-01-01 PROCEDURE — 83735 ASSAY OF MAGNESIUM: CPT

## 2020-01-01 PROCEDURE — 88302 TISSUE EXAM BY PATHOLOGIST: CPT

## 2020-01-01 PROCEDURE — 84145 PROCALCITONIN (PCT): CPT

## 2020-01-01 PROCEDURE — 82550 ASSAY OF CK (CPK): CPT

## 2020-01-01 PROCEDURE — 85610 PROTHROMBIN TIME: CPT

## 2020-01-01 PROCEDURE — 99223 1ST HOSP IP/OBS HIGH 75: CPT

## 2020-01-01 PROCEDURE — 70498 CT ANGIOGRAPHY NECK: CPT

## 2020-01-01 PROCEDURE — 49585: CPT

## 2020-01-01 PROCEDURE — 71045 X-RAY EXAM CHEST 1 VIEW: CPT

## 2020-01-01 PROCEDURE — 99285 EMERGENCY DEPT VISIT HI MDM: CPT

## 2020-01-01 PROCEDURE — 87635 SARS-COV-2 COVID-19 AMP PRB: CPT

## 2020-01-01 PROCEDURE — 82330 ASSAY OF CALCIUM: CPT

## 2020-01-01 PROCEDURE — 96366 THER/PROPH/DIAG IV INF ADDON: CPT

## 2020-01-01 PROCEDURE — 70450 CT HEAD/BRAIN W/O DYE: CPT

## 2020-01-01 PROCEDURE — 82140 ASSAY OF AMMONIA: CPT

## 2020-01-01 PROCEDURE — 99232 SBSQ HOSP IP/OBS MODERATE 35: CPT

## 2020-01-01 PROCEDURE — 99285 EMERGENCY DEPT VISIT HI MDM: CPT | Mod: 25

## 2020-01-01 PROCEDURE — 82962 GLUCOSE BLOOD TEST: CPT

## 2020-01-01 PROCEDURE — 94003 VENT MGMT INPAT SUBQ DAY: CPT

## 2020-01-01 PROCEDURE — 36556 INSERT NON-TUNNEL CV CATH: CPT

## 2020-01-01 PROCEDURE — 70450 CT HEAD/BRAIN W/O DYE: CPT | Mod: 26,59

## 2020-01-01 PROCEDURE — 80053 COMPREHEN METABOLIC PANEL: CPT

## 2020-01-01 PROCEDURE — 81001 URINALYSIS AUTO W/SCOPE: CPT

## 2020-01-01 PROCEDURE — 96365 THER/PROPH/DIAG IV INF INIT: CPT

## 2020-01-01 PROCEDURE — 82728 ASSAY OF FERRITIN: CPT

## 2020-01-01 PROCEDURE — 70498 CT ANGIOGRAPHY NECK: CPT | Mod: 26

## 2020-01-01 PROCEDURE — 85379 FIBRIN DEGRADATION QUANT: CPT

## 2020-01-01 PROCEDURE — 36620 INSERTION CATHETER ARTERY: CPT

## 2020-01-01 PROCEDURE — 74018 RADEX ABDOMEN 1 VIEW: CPT | Mod: 26

## 2020-01-01 PROCEDURE — 99233 SBSQ HOSP IP/OBS HIGH 50: CPT

## 2020-01-01 PROCEDURE — 74018 RADEX ABDOMEN 1 VIEW: CPT

## 2020-01-01 PROCEDURE — 85014 HEMATOCRIT: CPT

## 2020-01-01 RX ORDER — MIDAZOLAM HYDROCHLORIDE 1 MG/ML
0.02 INJECTION, SOLUTION INTRAMUSCULAR; INTRAVENOUS
Qty: 100 | Refills: 0 | Status: DISCONTINUED | OUTPATIENT
Start: 2020-01-01 | End: 2020-01-01

## 2020-01-01 RX ORDER — FENTANYL CITRATE 50 UG/ML
100 INJECTION INTRAVENOUS ONCE
Refills: 0 | Status: DISCONTINUED | OUTPATIENT
Start: 2020-01-01 | End: 2020-01-01

## 2020-01-01 RX ORDER — FUROSEMIDE 40 MG
40 TABLET ORAL ONCE
Refills: 0 | Status: COMPLETED | OUTPATIENT
Start: 2020-01-01 | End: 2020-01-01

## 2020-01-01 RX ORDER — FUROSEMIDE 40 MG
40 TABLET ORAL ONCE
Refills: 0 | Status: DISCONTINUED | OUTPATIENT
Start: 2020-01-01 | End: 2020-01-01

## 2020-01-01 RX ORDER — SODIUM CHLORIDE 9 MG/ML
3 INJECTION INTRAMUSCULAR; INTRAVENOUS; SUBCUTANEOUS EVERY 8 HOURS
Refills: 0 | Status: DISCONTINUED | OUTPATIENT
Start: 2020-01-01 | End: 2020-01-01

## 2020-01-01 RX ORDER — ALBUTEROL 90 UG/1
2 AEROSOL, METERED ORAL EVERY 6 HOURS
Refills: 0 | Status: DISCONTINUED | OUTPATIENT
Start: 2020-01-01 | End: 2020-01-01

## 2020-01-01 RX ORDER — DEXTROSE 50 % IN WATER 50 %
15 SYRINGE (ML) INTRAVENOUS ONCE
Refills: 0 | Status: DISCONTINUED | OUTPATIENT
Start: 2020-01-01 | End: 2020-01-01

## 2020-01-01 RX ORDER — METOCLOPRAMIDE HCL 10 MG
10 TABLET ORAL ONCE
Refills: 0 | Status: COMPLETED | OUTPATIENT
Start: 2020-01-01 | End: 2020-01-01

## 2020-01-01 RX ORDER — ACETAMINOPHEN 500 MG
650 TABLET ORAL ONCE
Refills: 0 | Status: COMPLETED | OUTPATIENT
Start: 2020-01-01 | End: 2020-01-01

## 2020-01-01 RX ORDER — OXYCODONE HYDROCHLORIDE 5 MG/1
5 TABLET ORAL EVERY 4 HOURS
Refills: 0 | Status: DISCONTINUED | OUTPATIENT
Start: 2020-01-01 | End: 2020-01-01

## 2020-01-01 RX ORDER — CISATRACURIUM BESYLATE 2 MG/ML
10 INJECTION INTRAVENOUS ONCE
Refills: 0 | Status: COMPLETED | OUTPATIENT
Start: 2020-01-01 | End: 2020-01-01

## 2020-01-01 RX ORDER — HEPARIN SODIUM 5000 [USP'U]/ML
3000 INJECTION INTRAVENOUS; SUBCUTANEOUS EVERY 6 HOURS
Refills: 0 | Status: DISCONTINUED | OUTPATIENT
Start: 2020-01-01 | End: 2020-01-01

## 2020-01-01 RX ORDER — HYDROXYCHLOROQUINE SULFATE 200 MG
TABLET ORAL
Refills: 0 | Status: COMPLETED | OUTPATIENT
Start: 2020-01-01 | End: 2020-01-01

## 2020-01-01 RX ORDER — HEPARIN SODIUM 5000 [USP'U]/ML
INJECTION INTRAVENOUS; SUBCUTANEOUS
Qty: 25000 | Refills: 0 | Status: DISCONTINUED | OUTPATIENT
Start: 2020-01-01 | End: 2020-01-01

## 2020-01-01 RX ORDER — POTASSIUM CHLORIDE 20 MEQ
10 PACKET (EA) ORAL
Refills: 0 | Status: COMPLETED | OUTPATIENT
Start: 2020-01-01 | End: 2020-01-01

## 2020-01-01 RX ORDER — HEPARIN SODIUM 5000 [USP'U]/ML
6500 INJECTION INTRAVENOUS; SUBCUTANEOUS ONCE
Refills: 0 | Status: DISCONTINUED | OUTPATIENT
Start: 2020-01-01 | End: 2020-01-01

## 2020-01-01 RX ORDER — DEXTROSE 50 % IN WATER 50 %
12.5 SYRINGE (ML) INTRAVENOUS ONCE
Refills: 0 | Status: DISCONTINUED | OUTPATIENT
Start: 2020-01-01 | End: 2020-01-01

## 2020-01-01 RX ORDER — HEPARIN SODIUM 5000 [USP'U]/ML
5000 INJECTION INTRAVENOUS; SUBCUTANEOUS EVERY 8 HOURS
Refills: 0 | Status: DISCONTINUED | OUTPATIENT
Start: 2020-01-01 | End: 2020-01-01

## 2020-01-01 RX ORDER — NOREPINEPHRINE BITARTRATE/D5W 8 MG/250ML
0.05 PLASTIC BAG, INJECTION (ML) INTRAVENOUS
Qty: 16 | Refills: 0 | Status: DISCONTINUED | OUTPATIENT
Start: 2020-01-01 | End: 2020-01-01

## 2020-01-01 RX ORDER — DEXTROSE 50 % IN WATER 50 %
25 SYRINGE (ML) INTRAVENOUS ONCE
Refills: 0 | Status: DISCONTINUED | OUTPATIENT
Start: 2020-01-01 | End: 2020-01-01

## 2020-01-01 RX ORDER — ANAKINRA 100MG/0.67
100 SYRINGE (ML) SUBCUTANEOUS
Refills: 0 | Status: DISCONTINUED | OUTPATIENT
Start: 2020-01-01 | End: 2020-01-01

## 2020-01-01 RX ORDER — CHLORHEXIDINE GLUCONATE 213 G/1000ML
1 SOLUTION TOPICAL ONCE
Refills: 0 | Status: COMPLETED | OUTPATIENT
Start: 2020-01-01 | End: 2020-01-01

## 2020-01-01 RX ORDER — OXYCODONE HYDROCHLORIDE 5 MG/1
1 TABLET ORAL
Qty: 12 | Refills: 0
Start: 2020-01-01 | End: 2020-01-01

## 2020-01-01 RX ORDER — POLYETHYLENE GLYCOL 3350 17 G/17G
17 POWDER, FOR SOLUTION ORAL DAILY
Refills: 0 | Status: DISCONTINUED | OUTPATIENT
Start: 2020-01-01 | End: 2020-01-01

## 2020-01-01 RX ORDER — SODIUM CHLORIDE 0.65 %
1 AEROSOL, SPRAY (ML) NASAL EVERY 8 HOURS
Refills: 0 | Status: DISCONTINUED | OUTPATIENT
Start: 2020-01-01 | End: 2020-01-01

## 2020-01-01 RX ORDER — FENTANYL CITRATE 50 UG/ML
0.5 INJECTION INTRAVENOUS
Qty: 5000 | Refills: 0 | Status: DISCONTINUED | OUTPATIENT
Start: 2020-01-01 | End: 2020-01-01

## 2020-01-01 RX ORDER — CEFTRIAXONE 500 MG/1
1000 INJECTION, POWDER, FOR SOLUTION INTRAMUSCULAR; INTRAVENOUS ONCE
Refills: 0 | Status: COMPLETED | OUTPATIENT
Start: 2020-01-01 | End: 2020-01-01

## 2020-01-01 RX ORDER — ROCURONIUM BROMIDE 10 MG/ML
4 VIAL (ML) INTRAVENOUS
Qty: 500 | Refills: 0 | Status: DISCONTINUED | OUTPATIENT
Start: 2020-01-01 | End: 2020-01-01

## 2020-01-01 RX ORDER — ONDANSETRON 8 MG/1
4 TABLET, FILM COATED ORAL ONCE
Refills: 0 | Status: COMPLETED | OUTPATIENT
Start: 2020-01-01 | End: 2020-01-01

## 2020-01-01 RX ORDER — ENOXAPARIN SODIUM 100 MG/ML
40 INJECTION SUBCUTANEOUS DAILY
Refills: 0 | Status: DISCONTINUED | OUTPATIENT
Start: 2020-01-01 | End: 2020-01-01

## 2020-01-01 RX ORDER — ASPIRIN/CALCIUM CARB/MAGNESIUM 324 MG
81 TABLET ORAL DAILY
Refills: 0 | Status: DISCONTINUED | OUTPATIENT
Start: 2020-01-01 | End: 2020-01-01

## 2020-01-01 RX ORDER — SENNA PLUS 8.6 MG/1
2 TABLET ORAL AT BEDTIME
Refills: 0 | Status: DISCONTINUED | OUTPATIENT
Start: 2020-01-01 | End: 2020-01-01

## 2020-01-01 RX ORDER — ACETAMINOPHEN 500 MG
1000 TABLET ORAL ONCE
Refills: 0 | Status: COMPLETED | OUTPATIENT
Start: 2020-01-01 | End: 2020-01-01

## 2020-01-01 RX ORDER — HYDROXYCHLOROQUINE SULFATE 200 MG
400 TABLET ORAL EVERY 12 HOURS
Refills: 0 | Status: COMPLETED | OUTPATIENT
Start: 2020-01-01 | End: 2020-01-01

## 2020-01-01 RX ORDER — MIDAZOLAM HYDROCHLORIDE 1 MG/ML
2 INJECTION, SOLUTION INTRAMUSCULAR; INTRAVENOUS ONCE
Refills: 0 | Status: DISCONTINUED | OUTPATIENT
Start: 2020-01-01 | End: 2020-01-01

## 2020-01-01 RX ORDER — NOREPINEPHRINE BITARTRATE/D5W 8 MG/250ML
0.05 PLASTIC BAG, INJECTION (ML) INTRAVENOUS
Qty: 8 | Refills: 0 | Status: DISCONTINUED | OUTPATIENT
Start: 2020-01-01 | End: 2020-01-01

## 2020-01-01 RX ORDER — AZITHROMYCIN 500 MG/1
500 TABLET, FILM COATED ORAL ONCE
Refills: 0 | Status: COMPLETED | OUTPATIENT
Start: 2020-01-01 | End: 2020-01-01

## 2020-01-01 RX ORDER — CHLORHEXIDINE GLUCONATE 213 G/1000ML
1 SOLUTION TOPICAL
Refills: 0 | Status: DISCONTINUED | OUTPATIENT
Start: 2020-01-01 | End: 2020-01-01

## 2020-01-01 RX ORDER — GLUCAGON INJECTION, SOLUTION 0.5 MG/.1ML
1 INJECTION, SOLUTION SUBCUTANEOUS ONCE
Refills: 0 | Status: DISCONTINUED | OUTPATIENT
Start: 2020-01-01 | End: 2020-01-01

## 2020-01-01 RX ORDER — HYDROXYCHLOROQUINE SULFATE 200 MG
200 TABLET ORAL EVERY 12 HOURS
Refills: 0 | Status: COMPLETED | OUTPATIENT
Start: 2020-01-01 | End: 2020-01-01

## 2020-01-01 RX ORDER — CHLORHEXIDINE GLUCONATE 213 G/1000ML
15 SOLUTION TOPICAL EVERY 12 HOURS
Refills: 0 | Status: DISCONTINUED | OUTPATIENT
Start: 2020-01-01 | End: 2020-01-01

## 2020-01-01 RX ORDER — ACETAMINOPHEN 500 MG
650 TABLET ORAL EVERY 6 HOURS
Refills: 0 | Status: DISCONTINUED | OUTPATIENT
Start: 2020-01-01 | End: 2020-01-01

## 2020-01-01 RX ORDER — PROPOFOL 10 MG/ML
10 INJECTION, EMULSION INTRAVENOUS
Qty: 1000 | Refills: 0 | Status: DISCONTINUED | OUTPATIENT
Start: 2020-01-01 | End: 2020-01-01

## 2020-01-01 RX ORDER — LIDOCAINE HCL 20 MG/ML
0.2 VIAL (ML) INJECTION ONCE
Refills: 0 | Status: COMPLETED | OUTPATIENT
Start: 2020-01-01 | End: 2020-01-01

## 2020-01-01 RX ORDER — IBUPROFEN 200 MG
200 TABLET ORAL
Qty: 0 | Refills: 0 | DISCHARGE

## 2020-01-01 RX ORDER — CISATRACURIUM BESYLATE 2 MG/ML
3 INJECTION INTRAVENOUS
Qty: 200 | Refills: 0 | Status: DISCONTINUED | OUTPATIENT
Start: 2020-01-01 | End: 2020-01-01

## 2020-01-01 RX ORDER — ONDANSETRON 8 MG/1
4 TABLET, FILM COATED ORAL ONCE
Refills: 0 | Status: DISCONTINUED | OUTPATIENT
Start: 2020-01-01 | End: 2020-01-01

## 2020-01-01 RX ORDER — THIAMINE MONONITRATE (VIT B1) 100 MG
200 TABLET ORAL
Refills: 0 | Status: DISCONTINUED | OUTPATIENT
Start: 2020-01-01 | End: 2020-01-01

## 2020-01-01 RX ORDER — SODIUM CHLORIDE 9 MG/ML
1000 INJECTION, SOLUTION INTRAVENOUS
Refills: 0 | Status: DISCONTINUED | OUTPATIENT
Start: 2020-01-01 | End: 2020-01-01

## 2020-01-01 RX ORDER — POTASSIUM CHLORIDE 20 MEQ
40 PACKET (EA) ORAL ONCE
Refills: 0 | Status: COMPLETED | OUTPATIENT
Start: 2020-01-01 | End: 2020-01-01

## 2020-01-01 RX ORDER — HEPARIN SODIUM 5000 [USP'U]/ML
6500 INJECTION INTRAVENOUS; SUBCUTANEOUS EVERY 6 HOURS
Refills: 0 | Status: DISCONTINUED | OUTPATIENT
Start: 2020-01-01 | End: 2020-01-01

## 2020-01-01 RX ORDER — METOCLOPRAMIDE HCL 10 MG
5 TABLET ORAL ONCE
Refills: 0 | Status: DISCONTINUED | OUTPATIENT
Start: 2020-01-01 | End: 2020-01-01

## 2020-01-01 RX ORDER — HYDROMORPHONE HYDROCHLORIDE 2 MG/ML
0.25 INJECTION INTRAMUSCULAR; INTRAVENOUS; SUBCUTANEOUS
Refills: 0 | Status: DISCONTINUED | OUTPATIENT
Start: 2020-01-01 | End: 2020-01-01

## 2020-01-01 RX ORDER — PROPOFOL 10 MG/ML
10.01 INJECTION, EMULSION INTRAVENOUS
Qty: 1000 | Refills: 0 | Status: DISCONTINUED | OUTPATIENT
Start: 2020-01-01 | End: 2020-01-01

## 2020-01-01 RX ORDER — ENOXAPARIN SODIUM 100 MG/ML
40 INJECTION SUBCUTANEOUS EVERY 12 HOURS
Refills: 0 | Status: DISCONTINUED | OUTPATIENT
Start: 2020-01-01 | End: 2020-01-01

## 2020-01-01 RX ADMIN — Medication 81 MILLIGRAM(S): at 16:39

## 2020-01-01 RX ADMIN — Medication 100 MILLIGRAM(S): at 21:25

## 2020-01-01 RX ADMIN — Medication 0.2 MILLILITER(S): at 06:07

## 2020-01-01 RX ADMIN — Medication 650 MILLIGRAM(S): at 15:44

## 2020-01-01 RX ADMIN — CHLORHEXIDINE GLUCONATE 15 MILLILITER(S): 213 SOLUTION TOPICAL at 05:28

## 2020-01-01 RX ADMIN — Medication 100 MILLIGRAM(S): at 11:32

## 2020-01-01 RX ADMIN — Medication 100 MILLIEQUIVALENT(S): at 14:36

## 2020-01-01 RX ADMIN — Medication 650 MILLIGRAM(S): at 06:58

## 2020-01-01 RX ADMIN — FENTANYL CITRATE 2.04 MICROGRAM(S)/KG/HR: 50 INJECTION INTRAVENOUS at 09:03

## 2020-01-01 RX ADMIN — MIDAZOLAM HYDROCHLORIDE 2 MILLIGRAM(S): 1 INJECTION, SOLUTION INTRAMUSCULAR; INTRAVENOUS at 08:10

## 2020-01-01 RX ADMIN — Medication 650 MILLIGRAM(S): at 20:45

## 2020-01-01 RX ADMIN — Medication 100 MILLIGRAM(S): at 13:30

## 2020-01-01 RX ADMIN — Medication 400 MILLIGRAM(S): at 20:49

## 2020-01-01 RX ADMIN — Medication 650 MILLIGRAM(S): at 05:32

## 2020-01-01 RX ADMIN — CISATRACURIUM BESYLATE 14.7 MICROGRAM(S)/KG/MIN: 2 INJECTION INTRAVENOUS at 09:14

## 2020-01-01 RX ADMIN — Medication 650 MILLIGRAM(S): at 21:43

## 2020-01-01 RX ADMIN — Medication 200 MILLIGRAM(S): at 10:33

## 2020-01-01 RX ADMIN — Medication 200 MILLIGRAM(S): at 21:25

## 2020-01-01 RX ADMIN — Medication 40 MILLIGRAM(S): at 06:07

## 2020-01-01 RX ADMIN — Medication 100 MILLIGRAM(S): at 05:32

## 2020-01-01 RX ADMIN — Medication 40 MILLIGRAM(S): at 18:40

## 2020-01-01 RX ADMIN — Medication 100 MILLIGRAM(S): at 16:39

## 2020-01-01 RX ADMIN — CHLORHEXIDINE GLUCONATE 15 MILLILITER(S): 213 SOLUTION TOPICAL at 17:29

## 2020-01-01 RX ADMIN — CHLORHEXIDINE GLUCONATE 1 APPLICATION(S): 213 SOLUTION TOPICAL at 06:07

## 2020-01-01 RX ADMIN — HEPARIN SODIUM 5000 UNIT(S): 5000 INJECTION INTRAVENOUS; SUBCUTANEOUS at 14:31

## 2020-01-01 RX ADMIN — ENOXAPARIN SODIUM 40 MILLIGRAM(S): 100 INJECTION SUBCUTANEOUS at 11:23

## 2020-01-01 RX ADMIN — POLYETHYLENE GLYCOL 3350 17 GRAM(S): 17 POWDER, FOR SOLUTION ORAL at 11:32

## 2020-01-01 RX ADMIN — Medication 100 MILLIGRAM(S): at 03:30

## 2020-01-01 RX ADMIN — Medication 100 MILLIGRAM(S): at 05:08

## 2020-01-01 RX ADMIN — Medication 10 MILLIGRAM(S): at 02:56

## 2020-01-01 RX ADMIN — Medication 100 MILLIGRAM(S): at 05:10

## 2020-01-01 RX ADMIN — HEPARIN SODIUM 5000 UNIT(S): 5000 INJECTION INTRAVENOUS; SUBCUTANEOUS at 21:43

## 2020-01-01 RX ADMIN — CEFTRIAXONE 100 MILLIGRAM(S): 500 INJECTION, POWDER, FOR SOLUTION INTRAMUSCULAR; INTRAVENOUS at 01:35

## 2020-01-01 RX ADMIN — Medication 40 MILLIGRAM(S): at 03:18

## 2020-01-01 RX ADMIN — Medication 100 MILLIGRAM(S): at 12:52

## 2020-01-01 RX ADMIN — OXYCODONE HYDROCHLORIDE 5 MILLIGRAM(S): 5 TABLET ORAL at 10:24

## 2020-01-01 RX ADMIN — ALBUTEROL 2 PUFF(S): 90 AEROSOL, METERED ORAL at 16:01

## 2020-01-01 RX ADMIN — Medication 200 MILLIGRAM(S): at 09:13

## 2020-01-01 RX ADMIN — MIDAZOLAM HYDROCHLORIDE 1.63 MG/KG/HR: 1 INJECTION, SOLUTION INTRAMUSCULAR; INTRAVENOUS at 09:03

## 2020-01-01 RX ADMIN — PROPOFOL 4.9 MICROGRAM(S)/KG/MIN: 10 INJECTION, EMULSION INTRAVENOUS at 09:03

## 2020-01-01 RX ADMIN — Medication 650 MILLIGRAM(S): at 08:02

## 2020-01-01 RX ADMIN — Medication 81 MILLIGRAM(S): at 13:30

## 2020-01-01 RX ADMIN — CHLORHEXIDINE GLUCONATE 15 MILLILITER(S): 213 SOLUTION TOPICAL at 18:55

## 2020-01-01 RX ADMIN — MIDAZOLAM HYDROCHLORIDE 1.63 MG/KG/HR: 1 INJECTION, SOLUTION INTRAMUSCULAR; INTRAVENOUS at 17:54

## 2020-01-01 RX ADMIN — Medication 100 MILLIGRAM(S): at 02:41

## 2020-01-01 RX ADMIN — Medication 100 MILLIGRAM(S): at 16:59

## 2020-01-01 RX ADMIN — Medication 100 MILLIGRAM(S): at 20:47

## 2020-01-01 RX ADMIN — CISATRACURIUM BESYLATE 10 MILLIGRAM(S): 2 INJECTION INTRAVENOUS at 08:15

## 2020-01-01 RX ADMIN — CISATRACURIUM BESYLATE 10 MILLIGRAM(S): 2 INJECTION INTRAVENOUS at 08:10

## 2020-01-01 RX ADMIN — Medication 100 MILLIGRAM(S): at 18:12

## 2020-01-01 RX ADMIN — MIDAZOLAM HYDROCHLORIDE 2 MILLIGRAM(S): 1 INJECTION, SOLUTION INTRAMUSCULAR; INTRAVENOUS at 07:00

## 2020-01-01 RX ADMIN — HEPARIN SODIUM 5000 UNIT(S): 5000 INJECTION INTRAVENOUS; SUBCUTANEOUS at 06:24

## 2020-01-01 RX ADMIN — ONDANSETRON 4 MILLIGRAM(S): 8 TABLET, FILM COATED ORAL at 02:00

## 2020-01-01 RX ADMIN — Medication 650 MILLIGRAM(S): at 20:08

## 2020-01-01 RX ADMIN — SODIUM CHLORIDE 3 MILLILITER(S): 9 INJECTION INTRAMUSCULAR; INTRAVENOUS; SUBCUTANEOUS at 06:07

## 2020-01-01 RX ADMIN — Medication 100 MILLIEQUIVALENT(S): at 09:45

## 2020-01-01 RX ADMIN — Medication 200 MILLIGRAM(S): at 21:48

## 2020-01-01 RX ADMIN — HEPARIN SODIUM 5000 UNIT(S): 5000 INJECTION INTRAVENOUS; SUBCUTANEOUS at 09:44

## 2020-01-01 RX ADMIN — Medication 40 MILLIGRAM(S): at 18:12

## 2020-01-01 RX ADMIN — CHLORHEXIDINE GLUCONATE 1 APPLICATION(S): 213 SOLUTION TOPICAL at 05:28

## 2020-01-01 RX ADMIN — CHLORHEXIDINE GLUCONATE 1 APPLICATION(S): 213 SOLUTION TOPICAL at 08:16

## 2020-01-01 RX ADMIN — Medication 650 MILLIGRAM(S): at 16:08

## 2020-01-01 RX ADMIN — FENTANYL CITRATE 100 MICROGRAM(S): 50 INJECTION INTRAVENOUS at 06:00

## 2020-01-01 RX ADMIN — Medication 40 MILLIGRAM(S): at 05:32

## 2020-01-01 RX ADMIN — Medication 200 MILLIGRAM(S): at 00:02

## 2020-01-01 RX ADMIN — Medication 650 MILLIGRAM(S): at 03:25

## 2020-01-01 RX ADMIN — Medication 3.92 MICROGRAM(S)/KG/MIN: at 14:45

## 2020-01-01 RX ADMIN — Medication 650 MILLIGRAM(S): at 04:13

## 2020-01-01 RX ADMIN — Medication 200 MILLIGRAM(S): at 12:16

## 2020-01-01 RX ADMIN — Medication 650 MILLIGRAM(S): at 01:31

## 2020-01-01 RX ADMIN — Medication 260 MILLIGRAM(S): at 15:02

## 2020-01-01 RX ADMIN — Medication 650 MILLIGRAM(S): at 05:10

## 2020-01-01 RX ADMIN — OXYCODONE HYDROCHLORIDE 5 MILLIGRAM(S): 5 TABLET ORAL at 10:26

## 2020-01-01 RX ADMIN — HEPARIN SODIUM 5000 UNIT(S): 5000 INJECTION INTRAVENOUS; SUBCUTANEOUS at 05:10

## 2020-01-01 RX ADMIN — Medication 100 MILLIGRAM(S): at 17:20

## 2020-01-01 RX ADMIN — AZITHROMYCIN 500 MILLIGRAM(S): 500 TABLET, FILM COATED ORAL at 04:13

## 2020-01-01 RX ADMIN — CHLORHEXIDINE GLUCONATE 1 APPLICATION(S): 213 SOLUTION TOPICAL at 09:05

## 2020-01-01 RX ADMIN — Medication 100 MILLIGRAM(S): at 06:58

## 2020-01-01 RX ADMIN — CHLORHEXIDINE GLUCONATE 15 MILLILITER(S): 213 SOLUTION TOPICAL at 08:14

## 2020-01-01 RX ADMIN — Medication 100 MILLIGRAM(S): at 00:21

## 2020-01-01 RX ADMIN — Medication 40 MILLIEQUIVALENT(S): at 04:46

## 2020-01-01 RX ADMIN — Medication 100 MILLIGRAM(S): at 00:47

## 2020-01-01 RX ADMIN — HEPARIN SODIUM 5000 UNIT(S): 5000 INJECTION INTRAVENOUS; SUBCUTANEOUS at 18:42

## 2020-01-01 RX ADMIN — Medication 100 MILLIGRAM(S): at 09:00

## 2020-01-01 RX ADMIN — CEFTRIAXONE 1000 MILLIGRAM(S): 500 INJECTION, POWDER, FOR SOLUTION INTRAMUSCULAR; INTRAVENOUS at 02:08

## 2020-01-01 RX ADMIN — AZITHROMYCIN 250 MILLIGRAM(S): 500 TABLET, FILM COATED ORAL at 02:08

## 2020-01-01 RX ADMIN — Medication 40 MILLIGRAM(S): at 17:20

## 2020-01-01 RX ADMIN — Medication 10 MILLIGRAM(S): at 01:57

## 2020-01-01 RX ADMIN — Medication 7.65 MICROGRAM(S)/KG/MIN: at 09:04

## 2020-01-01 RX ADMIN — Medication 100 MILLIGRAM(S): at 14:39

## 2020-01-01 RX ADMIN — Medication 100 MILLIGRAM(S): at 20:08

## 2020-01-01 RX ADMIN — Medication 100 MILLIGRAM(S): at 12:16

## 2020-01-01 RX ADMIN — Medication 200 MILLIGRAM(S): at 21:43

## 2020-01-01 RX ADMIN — HEPARIN SODIUM 5000 UNIT(S): 5000 INJECTION INTRAVENOUS; SUBCUTANEOUS at 00:02

## 2020-01-01 RX ADMIN — Medication 400 MILLIGRAM(S): at 02:00

## 2020-01-01 RX ADMIN — Medication 400 MILLIGRAM(S): at 10:31

## 2020-01-01 RX ADMIN — PROPOFOL 4.9 MICROGRAM(S)/KG/MIN: 10 INJECTION, EMULSION INTRAVENOUS at 17:30

## 2020-01-01 RX ADMIN — CHLORHEXIDINE GLUCONATE 15 MILLILITER(S): 213 SOLUTION TOPICAL at 16:39

## 2020-01-01 RX ADMIN — Medication 100 MILLIGRAM(S): at 00:11

## 2020-01-01 RX ADMIN — Medication 100 MILLIEQUIVALENT(S): at 11:25

## 2020-01-01 RX ADMIN — Medication 40 MILLIGRAM(S): at 09:44

## 2020-01-01 RX ADMIN — FENTANYL CITRATE 100 MICROGRAM(S): 50 INJECTION INTRAVENOUS at 07:10

## 2020-01-01 RX ADMIN — Medication 200 MILLIGRAM(S): at 12:50

## 2020-01-01 RX ADMIN — ALBUTEROL 2 PUFF(S): 90 AEROSOL, METERED ORAL at 01:11

## 2020-01-08 PROBLEM — Z87.19 PERSONAL HISTORY OF OTHER DISEASES OF THE DIGESTIVE SYSTEM: Chronic | Status: ACTIVE | Noted: 2019-01-01

## 2020-01-08 NOTE — ASU DISCHARGE PLAN (ADULT/PEDIATRIC) - ASU DC SPECIAL INSTRUCTIONSFT
Take tylenol and motrin for pain, additional pain medication sent to your pharmacy IF needed.  Take outer dressing off in 24 hours, let strips fall off on their own.    WOUND CARE:  Please keep incisions clean and dry. Please do not Scrub or rub incisions. Do not use lotion or powder on incisions.   BATHING: Please do not submerge wound underwater. You may shower and/or sponge bathe.  ACTIVITY: No heavy lifting or straining. Otherwise, you may return to your usual level of physical activity. If you are taking narcotic pain medication (such as Percocet) DO NOT drive a car, operate machinery or make important decisions.  DIET: Return to your usual diet.  NOTIFY YOUR SURGEON IF: You have any bleeding that does not stop, any pus draining from your wound(s), any fever (over 100.4 F) or chills, persistent nausea/vomiting, persistent diarrhea, or if your pain is not controlled on your discharge pain medications.  FOLLOW-UP: Please follow up with your primary care physician in one week regarding your hospitalization. Please follow-up with your surgeon, - please call to schedule an appointment.

## 2020-01-08 NOTE — PRE-ANESTHESIA EVALUATION ADULT - NSANTHOSAYNRD_GEN_A_CORE
Pts  Mom reports that she fell off the swing around 1800 tonight.  Pt is complaining of left arm pain in the middle of her lower arm.  Pt denies hitting her head.  Pt last took ibuprofen at 1920.     neck = 16 inches/No. ANDREA screening performed.  STOP BANG Legend: 0-2 = LOW Risk; 3-4 = INTERMEDIATE Risk; 5-8 = HIGH Risk

## 2020-01-08 NOTE — PRE-ANESTHESIA EVALUATION ADULT - NSANTHADDINFOFT_GEN_ALL_CORE
Anesthetic plan, including risks and benefits (including heart attack and stroke) discussed with patient.  Patient made aware CRNA involved in care.

## 2020-01-14 NOTE — HISTORY OF PRESENT ILLNESS
[de-identified] : No acute events since the procedure. Patient states that pain is well controlled with OTC pain medications, he is tolerating diet well, having regular bowel function. He denies N/V, no fever/chills.\par \par Abdominal exam reveals a well-healing incision, no erythema/drainage, no recurrence of umbilical hernia, non-TTP in all quadrants, no rebound/guarding\par \par 49 year old male s/p umbilical hernia repair on 1/8/2020, recovering well\par - Patient advised to avoid heavy lifting for total of 4 weeks postop\par - Patient advised to return to ED or call office should he experience N/V, fever/chills, worsening pain or signs of infection at surgical site

## 2020-03-29 NOTE — ED PROVIDER NOTE - NS ED MD DISPO SPECIAL CONSIDERATION1
reading glasses/Partially impaired: cannot see medication labels or newsprint, but can see obstacles in path, and the surrounding layout; can count fingers at arm's length Possible COVID-19

## 2020-03-29 NOTE — ED PROVIDER NOTE - CLINICAL SUMMARY MEDICAL DECISION MAKING FREE TEXT BOX
Pt. is a 49 y.o. M p/w fevers, cough, diarrhea, sob.  Likely viral in etiology, specifically covid, will r/o w/ covid swab and CXR.  Pt. hypoxemic to 89% on RA and is requiring 7L NC to saturate at 96-97%.  Will draw inpatient covid labs.  Will reassess and will likely admit. Pt. is a 49 y.o. M p/w fevers, cough, diarrhea, sob.  Likely viral PNA given crackles at b/l lung bases in etiology, specifically covid, will r/o w/ covid swab and CXR.  Pt. hypoxemic to 89% on RA and is requiring 7L NC to saturate at 96-97%.  Will draw inpatient covid labs.  Will reassess and will likely admit. Pt. is a 49 y.o. M p/w fevers, cough, diarrhea, sob.  Likely viral PNA given crackles at b/l lung bases in etiology, specifically covid, will r/o w/ covid swab and CXR.  Pt. hypoxemic to 89% on RA and is requiring 7L NC to saturate at 96-97%.  Will draw inpatient covid labs.  Will reassess and will likely admit.  Attending Jenny Cason: 48 y/o male presenting with fevers, cough, diarrhea, decreased taste sensation for last few days. upon arrival pt hypoxic and tachycardic requiring supplemental oxygen. +sick contacts at home. concern for likely pna, suspect likely viral, consider covid. abdomen soft and nontender. will obtain labs, BP stable will hold off on fluid, and admit

## 2020-03-29 NOTE — ED PROVIDER NOTE - NS ED ROS FT
General: +fever; denies chills, weight loss/weight gain.  HENT: denies nasal congestion, sore throat, rhinorrhea, ear pain  Eyes: denies visual changes, blurred vision, eye discharge, eye redness  Neck: denies neck pain, neck swelling  CV: denies chest pain, palpitations  Resp: +difficulty breathing, cough  Abdominal: +diarrhea; denies nausea, vomiting, abdominal pain, blood in stool, dark stool  MSK: +muscle aches; denies bony pain, leg pain, leg swelling  Neuro: +headaches; denies numbness, tingling, dizziness, lightheadedness.  Skin: denies rashes, cuts, bruises  Hematologic: denies unexplained bruises

## 2020-03-29 NOTE — ED ADULT NURSE NOTE - OBJECTIVE STATEMENT
Pt is a 49 Y A&O x3 M with no PMH presenting to ED with c/o fevers, dry cough, body aches, chills x6 days and SOB since yesterday. Pt last dose Tylenol at 11 PM tonight. Pt arrives to ED tachypneic, hypoxic to 89% on RA, breathing labored, having difficulty completing full sentences. Skin is warm and dry. No diaphoresis noted. No edema noted to LE b/l. 18 G IV x2 established. Pt placed on 7 L NC, oxygen improving. Safety and comfort maintained. MD at bedside for assessment.

## 2020-03-29 NOTE — H&P ADULT - ASSESSMENT
pt with COVID pneumonia      - start Plaquenil , monitor lfts given borderline lfts , check qtc  - suppl oxygen , incentive spirometer  dvt porphylaxis

## 2020-03-29 NOTE — ED PROVIDER NOTE - OBJECTIVE STATEMENT
Pt. is a 49 y.o. M w/ no significant PMHx p/w fevers, cough, myalgias, headache since six days.  +sick contacts at home w/ family members, nobody has been tested for covid.  Pt. called pmd on Monday who stated to watch sxs and go to ED if he becomes SOB.  Pt. started developing SOB yesterday and today had tmax of 103.  Pt. has been taking tylenol around the clock w/ good relief of sxs.  Pt. has also had nb persistent diarrhea for the last three days.  Pt. denies any respiratory or smoking hx.  Last dose tylenol at 11PM on 3/28/2020.

## 2020-03-29 NOTE — ED PROVIDER NOTE - PROGRESS NOTE DETAILS
Marcelo PGY1 - Discussed results w/ pt., who understands them.  Pt. agrees w/ admission.  All other questions and concerns have been addressed.  Pt. will be discharged.

## 2020-03-29 NOTE — ED PROVIDER NOTE - ATTENDING CONTRIBUTION TO CARE
Attending MD Jenny Cason:  I personally have seen and examined this patient.  Resident note reviewed and agree on plan of care and except where noted.  See HPI, PE, and MDM for details.

## 2020-03-29 NOTE — H&P ADULT - HISTORY OF PRESENT ILLNESS
49 y.o. M w/ no significant PMHx p/w fevers, cough, myalgias, headache since six days.  +sick contacts at home w/ family members, nobody has been tested for covid.  Pt. called pmd on Monday who stated to watch sxs and go to ED if he becomes SOB.  Pt. started developing SOB yesterday and today had tmax of 103.  Pt. has been taking tylenol around the clock w/ good relief of sxs.  Pt. has also had nb persistent diarrhea for the last three days.  Pt. denies any respiratory or smoking hx

## 2020-03-29 NOTE — ED PROVIDER NOTE - PHYSICAL EXAMINATION
GENERAL: Patient awake alert NAD.  HEENT: NC/AT.  LUNGS: crackles in b/l lung bases.  CARDIAC: RRR, no m/r/g.    ABDOMEN: Soft, NT, ND, No rebound, guarding. No CVA tenderness.   EXT: No edema. No calf tenderness. CV 2+DP/PT bilaterally.   MSK: No spinal tenderness, no pain with movement, no deformities.  NEURO: A&Ox3. Moving all extremities.  SKIN: Warm and dry. No rash.  PSYCH: Normal affect. GENERAL: Patient awake alert NAD.  HEENT: NC/AT.  LUNGS: crackles in b/l lung bases.  CARDIAC: RRR, no m/r/g.    ABDOMEN: Soft, NT, ND, No rebound, guarding. No CVA tenderness.   EXT: No edema. No calf tenderness. CV 2+DP/PT bilaterally.   MSK: No spinal tenderness, no pain with movement, no deformities.  NEURO: A&Ox3. Moving all extremities.  SKIN: Warm and dry. No rash.  PSYCH: Normal affect.  Attending Jenny Cason: Gen: NAD, heent: atrauamtic, eomi, perrla, mmm, op pink, uvula midline, neck; nttp, no nuchal rigidity, chest: nttp, no crepitus, cv: tachycardic, lungs: scattered crackles, abd: soft, nontender, nondistended, no peritoneal signs, +BS, no guarding, ext: wwp, neg homans, skin: no rash, neuro: awake and alert, following commands, speech clear, sensation and strength intact, no focal deficits

## 2020-03-30 NOTE — CHART NOTE - NSCHARTNOTEFT_GEN_A_CORE
Pt on 6 liters on nasal cannula called with c/o shortness of breath and cough and O2 sat dropped to 80s     Placed on nonrebreather      Albuterol inhaler      Benzonate added prn  Improved resp status O2 sat improvedto 95%- monitor closely wean as appropriate

## 2020-03-30 NOTE — PROGRESS NOTE ADULT - ASSESSMENT
pt with COVID pneumonia      - on  Plaquenil, f/u lfts , ID f/u  - suppl oxygen , incentive spirometer  dvt prophylaxis

## 2020-03-30 NOTE — PROGRESS NOTE ADULT - SUBJECTIVE AND OBJECTIVE BOX
SUBJECTIVE / OVERNIGHT EVENTS: pt c/o shortness of breath       MEDICATIONS  (STANDING):  hydroxychloroquine   Oral   hydroxychloroquine 200 milliGRAM(s) Oral every 12 hours    MEDICATIONS  (PRN):  acetaminophen   Tablet .. 650 milliGRAM(s) Oral every 6 hours PRN Temp greater or equal to 38C (100.4F)  guaiFENesin   Syrup  (Sugar-Free) 100 milliGRAM(s) Oral every 6 hours PRN Cough    Vital Signs Last 24 Hrs  T(C): 37.7 (30 Mar 2020 14:32), Max: 39.3 (30 Mar 2020 01:00)  T(F): 99.8 (30 Mar 2020 14:32), Max: 102.7 (30 Mar 2020 01:00)  HR: 95 (30 Mar 2020 14:32) (89 - 98)  BP: 109/62 (30 Mar 2020 14:32) (100/63 - 126/69)  BP(mean): --  RR: 20 (30 Mar 2020 14:32) (20 - 22)  SpO2: 94% (30 Mar 2020 14:32) (85% - 98%)    CAPILLARY BLOOD GLUCOSE        I&O's Summary    29 Mar 2020 07:01  -  30 Mar 2020 07:00  --------------------------------------------------------  IN: 240 mL / OUT: 0 mL / NET: 240 mL    30 Mar 2020 07:01  -  30 Mar 2020 14:58  --------------------------------------------------------  IN: 180 mL / OUT: 0 mL / NET: 180 mL        Constitutional: No fever, fatigue  Skin: No rash.  Eyes: No recent vision problems or eye pain.  ENT: No congestion, ear pain, or sore throat.  Cardiovascular: No chest pain or palpation.  Respiratory: No cough, shortness of breath, congestion, or wheezing.  Gastrointestinal: No abdominal pain, nausea, vomiting, or diarrhea.  Genitourinary: No dysuria.  Musculoskeletal: No joint swelling.  Neurologic: No headache.    PHYSICAL EXAM:  GENERAL: NAD  EYES: EOMI, PERRLA  NECK: Supple, No JVD  CHEST/LUNG: dec breath sounds at bases  HEART:  S1 , S2 +  ABDOMEN: soft , bs+  EXTREMITIES:  no edema  NEUROLOGY:alert awake oriented       LABS:                        13.3   4.91  )-----------( 141      ( 29 Mar 2020 01:57 )             39.4         133<L>  |  95<L>  |  6<L>  ----------------------------<  142<H>  3.3<L>   |  24  |  0.79    Ca    9.2      29 Mar 2020 01:57    TPro  7.4  /  Alb  4.1  /  TBili  0.4  /  DBili  x   /  AST  237<H>  /  ALT  246<H>  /  AlkPhos  170<H>      PT/INR - ( 29 Mar 2020 02:04 )   PT: 12.5 sec;   INR: 1.08 ratio         PTT - ( 29 Mar 2020 02:04 )  PTT:32.8 sec      Urinalysis Basic - ( 29 Mar 2020 01:58 )    Color: Light Yellow / Appearance: Clear / S.012 / pH: x  Gluc: x / Ketone: Negative  / Bili: Negative / Urobili: Negative   Blood: x / Protein: 30 mg/dL / Nitrite: Negative   Leuk Esterase: Negative / RBC: 0 /hpf / WBC 1 /HPF   Sq Epi: x / Non Sq Epi: 0 /hpf / Bacteria: Negative        RADIOLOGY & ADDITIONAL TESTS:    Imaging Personally Reviewed:    Consultant(s) Notes Reviewed:      Care Discussed with Consultants/Other Providers:

## 2020-03-30 NOTE — PROVIDER CONTACT NOTE (OTHER) - ASSESSMENT
pt having resp distress. pt placed on non rebreather at 15 with little improvement to 80%,  NC added on at 15 and NON-rebreather at 15. Pt stats at 90-92%. when pt coughs SATS go down. Pt encouraged to lay on side which dropped O2 to 84. Pt now sitting on side of bed. O2 improved to 93%.

## 2020-03-30 NOTE — PROVIDER CONTACT NOTE (OTHER) - SITUATION
pt admitted with positive covid-19. Pt complaining of SOB. pulse ox reading 69. pt immediately placed on non-rebreather at 15L. Pt stats went up to 80 with in a couple minutes.

## 2020-03-31 NOTE — PROGRESS NOTE ADULT - SUBJECTIVE AND OBJECTIVE BOX
late note entry     SUBJECTIVE / OVERNIGHT EVENTS: pt c/o shortness of breath       MEDICATIONS  (STANDING):  hydroxychloroquine   Oral   hydroxychloroquine 200 milliGRAM(s) Oral every 12 hours    MEDICATIONS  (PRN):  acetaminophen   Tablet .. 650 milliGRAM(s) Oral every 6 hours PRN Temp greater or equal to 38C (100.4F)  guaiFENesin   Syrup  (Sugar-Free) 100 milliGRAM(s) Oral every 6 hours PRN Cough    Vital Signs Last 24 Hrs  T(C): 37.7 (30 Mar 2020 14:32), Max: 39.3 (30 Mar 2020 01:00)  T(F): 99.8 (30 Mar 2020 14:32), Max: 102.7 (30 Mar 2020 01:00)  HR: 95 (30 Mar 2020 14:32) (89 - 98)  BP: 109/62 (30 Mar 2020 14:32) (100/63 - 126/69)  BP(mean): --  RR: 20 (30 Mar 2020 14:32) (20 - 22)  SpO2: 94% (30 Mar 2020 14:32) (85% - 98%)    CAPILLARY BLOOD GLUCOSE        I&O's Summary    29 Mar 2020 07:01  -  30 Mar 2020 07:00  --------------------------------------------------------  IN: 240 mL / OUT: 0 mL / NET: 240 mL    30 Mar 2020 07:01  -  30 Mar 2020 14:58  --------------------------------------------------------  IN: 180 mL / OUT: 0 mL / NET: 180 mL        Constitutional: No fever, fatigue  Skin: No rash.  Eyes: No recent vision problems or eye pain.  ENT: No congestion, ear pain, or sore throat.  Cardiovascular: No chest pain or palpation.  Respiratory: No cough, shortness of breath, congestion, or wheezing.  Gastrointestinal: No abdominal pain, nausea, vomiting, or diarrhea.  Genitourinary: No dysuria.  Musculoskeletal: No joint swelling.  Neurologic: No headache.    PHYSICAL EXAM:  GENERAL: NAD  EYES: EOMI, PERRLA  NECK: Supple, No JVD  CHEST/LUNG: dec breath sounds at bases  HEART:  S1 , S2 +  ABDOMEN: soft , bs+  EXTREMITIES:  no edema  NEUROLOGY:alert awake oriented       LABS:                        13.3   4.91  )-----------( 141      ( 29 Mar 2020 01:57 )             39.4         133<L>  |  95<L>  |  6<L>  ----------------------------<  142<H>  3.3<L>   |  24  |  0.79    Ca    9.2      29 Mar 2020 01:57    TPro  7.4  /  Alb  4.1  /  TBili  0.4  /  DBili  x   /  AST  237<H>  /  ALT  246<H>  /  AlkPhos  170<H>      PT/INR - ( 29 Mar 2020 02:04 )   PT: 12.5 sec;   INR: 1.08 ratio         PTT - ( 29 Mar 2020 02:04 )  PTT:32.8 sec      Urinalysis Basic - ( 29 Mar 2020 01:58 )    Color: Light Yellow / Appearance: Clear / S.012 / pH: x  Gluc: x / Ketone: Negative  / Bili: Negative / Urobili: Negative   Blood: x / Protein: 30 mg/dL / Nitrite: Negative   Leuk Esterase: Negative / RBC: 0 /hpf / WBC 1 /HPF   Sq Epi: x / Non Sq Epi: 0 /hpf / Bacteria: Negative        RADIOLOGY & ADDITIONAL TESTS:    Imaging Personally Reviewed:    Consultant(s) Notes Reviewed:      Care Discussed with Consultants/Other Providers:

## 2020-04-01 NOTE — CONSULT NOTE ADULT - SUBJECTIVE AND OBJECTIVE BOX
Pulmonary Consult Note    KADY VILLAR  MRN-[id number]    Chief Complaint: Patient is a 49y old  Male who presents with a chief complaint of fever , cough , shortness of breath (01 Apr 2020 10:52)      HPI:  49yMale   weakness, dry cough x 1 week  last few days worsening dyspnea.  fevers  covid positive    ROS:  -no N/V/D  All other systems reviewed and negative    PAST MEDICAL HISTORY: HEALTH ISSUES - PROBLEM Dx      SOCIAL HISTORY:     ACTIVE MEDICATION LIST:  MEDICATIONS  (STANDING):  heparin  Injectable 5000 Unit(s) SubCutaneous every 8 hours  hydroxychloroquine   Oral   hydroxychloroquine 200 milliGRAM(s) Oral every 12 hours    MEDICATIONS  (PRN):  acetaminophen   Tablet .. 650 milliGRAM(s) Oral every 6 hours PRN Temp greater or equal to 38C (100.4F)  ALBUTerol    90 MICROgram(s) HFA Inhaler 2 Puff(s) Inhalation every 6 hours PRN Shortness of Breath and/or Wheezing  benzonatate 100 milliGRAM(s) Oral three times a day PRN Cough  guaiFENesin   Syrup  (Sugar-Free) 100 milliGRAM(s) Oral every 6 hours PRN Cough      EXAM:  Vital Signs Last 24 Hrs  T(C): 37 (01 Apr 2020 10:10), Max: 37.6 (31 Mar 2020 17:44)  T(F): 98.6 (01 Apr 2020 10:10), Max: 99.7 (31 Mar 2020 17:44)  HR: 101 (01 Apr 2020 10:10) (89 - 101)  BP: 151/69 (01 Apr 2020 10:10) (135/76 - 151/69)  BP(mean): --  RR: 22 (01 Apr 2020 10:10) (22 - 22)  SpO2: 100% (01 Apr 2020 10:10) (93% - 100%)  GENERAL: No acute distress  NEURO: Alert and oriented x 3  LUNGS: Clear to auscultation bilaterally, no rales or wheezes  CV: S1/S2, no murmur  ABDOMEN: +BS, nontender  EXTREMITIES: no clubbing, cyanosis, edema    LABS/IMAGING: reviewed                        13.9   7.02  )-----------( 249      ( 01 Apr 2020 08:01 )             41.6   04-01    131<L>  |  89<L>  |  9   ----------------------------<  114<H>  3.8   |  25  |  0.78    Ca    9.2      01 Apr 2020 07:54    TPro  7.8  /  Alb  4.0  /  TBili  0.8  /  DBili  x   /  AST  186<H>  /  ALT  229<H>  /  AlkPhos  192<H>  04-01    < from: Xray Chest 1 View AP/PA (03.29.20 @ 01:19) >    EXAM:  XR CHEST AP OR PA 1V                            PROCEDURE DATE:  03/29/2020            INTERPRETATION:  CLINICAL INFORMATION: Shortness of breath.    TECHNIQUE: Single frontal radiograph of the chest 3/29/2020.    COMPARISON: Chest radiograph 5/10/2010.    FINDINGS:  Bilateral streaky opacities, compatible with pneumonia. Differential includes atypical/viral pneumonia including COVID19.  No pleural effusion. No pneumothorax.  Cardiac size cannot accurately be assessed in this projection.      IMPRESSION: Bilateral streaky opacities, compatible with pneumonia. Differential includes atypical/viral pneumonia including COVID19.    < end of copied text >    PROBLEM LIST:  49yMale with HEALTH ISSUES - PROBLEM Dx:  COVID infection    RECS:  -Supplemental oxygen and continuous pulse oximetry, goal O2 sat >94%,  can escalate to NRB with or without nasal canula as needed.  -Incentive spirometry, deep breathing exercises, encourage patient to self prone when able/as tolerated.  -If O2 sat dropping despite the above, will need to call ICU.  -Bronchodilator with albuterol MDI Q6 PRN, cough suppressants   -Hydroxychloroquine, monitor EKG/QTc daily.  -will add solumedrol  -No proven benefit but can consider addition of ascorbic acid 1500mg Q6hrs x 6 days, thiamine 200mg QHS x 6 days, zinc 220mg PO QHS  -Every 2-3 days trend: CRP, d-dimer, LDH, ferritin,   -DVT prophylaxis with lovenox 40mg subq daily     Thank you for this consultation, please feel free to call with any questions [phone]  Angeles Pugh MD

## 2020-04-01 NOTE — PROGRESS NOTE ADULT - ASSESSMENT
pt with COVID pneumonia      - on  Plaquenil, f/u lfts , ID f/u  - hypoxic on  suppl oxygen , incentive spirometer   dvt prophylaxis

## 2020-04-01 NOTE — PROGRESS NOTE ADULT - SUBJECTIVE AND OBJECTIVE BOX
SUBJECTIVE / OVERNIGHT EVENTS: pt c/o shortness of breath     MEDICATIONS  (STANDING):  heparin  Injectable 5000 Unit(s) SubCutaneous every 8 hours  hydroxychloroquine   Oral   hydroxychloroquine 200 milliGRAM(s) Oral every 12 hours    MEDICATIONS  (PRN):  acetaminophen   Tablet .. 650 milliGRAM(s) Oral every 6 hours PRN Temp greater or equal to 38C (100.4F)  ALBUTerol    90 MICROgram(s) HFA Inhaler 2 Puff(s) Inhalation every 6 hours PRN Shortness of Breath and/or Wheezing  benzonatate 100 milliGRAM(s) Oral three times a day PRN Cough  guaiFENesin   Syrup  (Sugar-Free) 100 milliGRAM(s) Oral every 6 hours PRN Cough    Vital Signs Last 24 Hrs  T(C): 37 (2020 10:10), Max: 37.6 (31 Mar 2020 17:44)  T(F): 98.6 (2020 10:10), Max: 99.7 (31 Mar 2020 17:44)  HR: 101 (2020 10:10) (89 - 101)  BP: 151/69 (2020 10:10) (135/76 - 151/69)  BP(mean): --  RR: 22 (2020 10:10) (22 - 22)  SpO2: 100% (2020 10:10) (93% - 100%)      Constitutional: No fever, fatigue  Skin: No rash.  Eyes: No recent vision problems or eye pain.  ENT: No congestion, ear pain, or sore throat.  Cardiovascular: No chest pain or palpation.  Respiratory: No cough, shortness of breath, congestion, or wheezing.  Gastrointestinal: No abdominal pain, nausea, vomiting, or diarrhea.  Genitourinary: No dysuria.  Musculoskeletal: No joint swelling.  Neurologic: No headache.    PHYSICAL EXAM:  GENERAL: NAD  EYES: EOMI, PERRLA  NECK: Supple, No JVD  CHEST/LUNG: dec breath sounds at bases  HEART:  S1 , S2 +  ABDOMEN: soft , bs+  EXTREMITIES:  no edema  NEUROLOGY:alert awake oriented   LABS:      131<L>  |  89<L>  |  9   ----------------------------<  114<H>  3.8   |  25  |  0.78    Ca    9.2      2020 07:54    TPro  7.8  /  Alb  4.0  /  TBili  0.8  /  DBili      /  AST  186<H>  /  ALT  229<H>  /  AlkPhos  192<H>  04-    Creatinine Trend: 0.78 <--, 0.82 <--, 0.79 <--                        13.9   7.02  )-----------( 249      ( 2020 08:01 )             41.6     Urine Studies:  Urinalysis Basic - ( 29 Mar 2020 01:58 )    Color: Light Yellow / Appearance: Clear / S.012 / pH:   Gluc:  / Ketone: Negative  / Bili: Negative / Urobili: Negative   Blood:  / Protein: 30 mg/dL / Nitrite: Negative   Leuk Esterase: Negative / RBC: 0 /hpf / WBC 1 /HPF   Sq Epi:  / Non Sq Epi: 0 /hpf / Bacteria: Negative              LIVER FUNCTIONS - ( 2020 07:54 )  Alb: 4.0 g/dL / Pro: 7.8 g/dL / ALK PHOS: 192 U/L / ALT: 229 U/L / AST: 186 U/L / GGT: x             Imaging Personally Reviewed:    Consultant(s) Notes Reviewed:      Care Discussed with Consultants/Other Providers:

## 2020-04-01 NOTE — CONSULT NOTE ADULT - ASSESSMENT
49 y.o. M w/ no significant PMHx p/w fevers, cough, myalgias, headache since six days.    Sepsis, fever, tachycardia, COVID-19 infection, pneumonia, transaminitis. hypoxia       Plan:   c/w with hydroxychloroquine  does not meet criteria for IL-6 trial due to LFT's   trend LFT's   Recommend CT angio chest to ensure no PE given high ferritin and LDH.   PCT in am   F/U Prelim blood cx, NTD

## 2020-04-01 NOTE — CONSULT NOTE ADULT - SUBJECTIVE AND OBJECTIVE BOX
Patient is a 49y old  Male who presents with a chief complaint of fever , cough , shortness of breath (01 Apr 2020 10:52)      HPI:  49 y.o. M w/ no significant PMHx p/w fevers, cough, myalgias, headache since six days.  +sick contacts at home w/ family members, nobody has been tested for covid.  Pt. called pmd on Monday who stated to watch sxs and go to ED if he becomes SOB.  Pt. started developing SOB yesterday and today had tmax of 103.  Pt. has been taking tylenol around the clock w/ good relief of sxs.  Pt. has also had nb persistent diarrhea for the last three days.  Pt. denies any respiratory or smoking hx (29 Mar 2020 13:52)  Above reviewed;   Pt complains of SOB, some cough, diarrhea for last 4 days.       PAST MEDICAL & SURGICAL HISTORY:  History of umbilical hernia  H/O right knee surgery: 2004 - ACL, meniscus tear  History of left knee surgery: 1992 - ACL, meniscus tear      REVIEW OF SYSTEMS    General: + Fevers     Skin: No rash  	  Ophthalmologic: Denies discharge, redness.  	  ENT: No throat pain.     Respiratory and Thorax: per HPI.   	  Cardiovascular: No chest pain,     Gastrointestinal: No nausea, abdominal pain or diarrhea.    Genitourinary: No dysuria,    Musculoskeletal: No joint swelling     Neurological: No extremity weakness. + headache    Psychiatric: No hallucinations	    Endocrine: No abnormal heat/cold intolerance    Allergic/Immunologic: No hives       Social history:  , no smoking, social EtOH.      FAMILY HISTORY:  Pt denies any hx of DM in first degree relatives.       Allergies  No Known Drug Allergies  pollen (Rhinorrhea)      Antimicrobials:  hydroxychloroquine 200 milliGRAM(s) Oral every 12 hours      Vital Signs Last 24 Hrs  T(C): 37 (01 Apr 2020 10:10), Max: 37.6 (31 Mar 2020 17:44)  T(F): 98.6 (01 Apr 2020 10:10), Max: 99.7 (31 Mar 2020 17:44)  HR: 101 (01 Apr 2020 10:10) (89 - 101)  BP: 151/69 (01 Apr 2020 10:10) (135/76 - 151/69)  BP(mean): --  RR: 22 (01 Apr 2020 10:10) (22 - 22)  SpO2: 100% (01 Apr 2020 10:10) (93% - 100%)      PHYSICAL EXAM: Patient in mild distress due to SOB.     Constitutional: Awake and alert    Eyes: No discharge or conjunctival injection    ENT: No thrush.     Neck: Supple,     Respiratory: + air entry bilaterally.    Cardiovascular: S1 S2 wnl, No murmurs, tachy     Gastrointestinal: Soft BS(+) no tenderness, non distended.    Genitourinary: No CVA tenderness     Extremities: No edema.    Vascular: peripheral pulses felt    Neurological: No grossly focal deficits.    Skin: No rash     Musculoskeletal: No joint swelling.    Psychiatric: Affect normal.                              13.9   7.02  )-----------( 249      ( 01 Apr 2020 08:01 )             41.6       04-01    131<L>  |  89<L>  |  9   ----------------------------<  114<H>  3.8   |  25  |  0.78    Ca    9.2      01 Apr 2020 07:54    TPro  7.8  /  Alb  4.0  /  TBili  0.8  /  DBili  x   /  AST  186<H>  /  ALT  229<H>  /  AlkPhos  192<H>  04-01      Legionella pneumophila Antigen, Urine (03.29.20 @ 03:49)    Legionella Antigen, Urine: Negative      Urinalysis (03.29.20 @ 01:58)    pH Urine: 7.0    Glucose Qualitative, Urine: Negative    Blood, Urine: Negative    Color: Light Yellow    Urine Appearance: Clear    Bilirubin: Negative    Ketone - Urine: Negative    Specific Gravity: 1.012    Protein, Urine: 30 mg/dL    Urobilinogen: Negative    Nitrite: Negative    Leukocyte Esterase Concentration: Negative  Urine Microscopic-Add On (NC) (03.29.20 @ 01:58)    Red Blood Cell - Urine: 0 /hpf    White Blood Cell - Urine: 1 /HPF    Bacteria: Negative    Epithelial Cells: 0 /hpf      Culture - Blood (03.29.20 @ 05:08)    Specimen Source: .Blood Blood-Peripheral    Culture Results:   No growth to date.      Culture - Urine (03.29.20 @ 07:11)    Specimen Source: .Urine Clean Catch (Midstream)    Culture Results:   No growth        Radiology: Imaging reviewed and visualized personally [ x]  < from: Xray Chest 1 View AP/PA (03.29.20 @ 01:19) >    IMPRESSION: Bilateral streaky opacities, compatible with pneumonia. Differential includes atypical/viral pneumonia including COVID19.

## 2020-04-02 NOTE — DIETITIAN INITIAL EVALUATION ADULT. - ADD RECOMMEND
1. Continue current Regular diet as ordered. 2. RD to provide Mighty Shake x2 daily (per servinkcals, 6g pro). 3. Monitor PO intake/tolerance, weights, labs, hydration status, bowels, and skin integrity. 4. RD remains available PRN

## 2020-04-02 NOTE — PROGRESS NOTE ADULT - SUBJECTIVE AND OBJECTIVE BOX
PULMONARY PROGRESS NOTE    KADY VILLAR  MRN-[id number]    Patient is a 49y old  Male who presents with a chief complaint of fever , cough , shortness of breath (02 Apr 2020 08:38)      HPI: feels better but stiil with very high 02 requirement  No chest pain-  on steroids  -    ROS: taking po  -  -    ACTIVE MEDICATION LIST:  MEDICATIONS  (STANDING):  enoxaparin Injectable 40 milliGRAM(s) SubCutaneous daily  hydroxychloroquine   Oral   hydroxychloroquine 200 milliGRAM(s) Oral every 12 hours  methylPREDNISolone sodium succinate Injectable 40 milliGRAM(s) IV Push every 12 hours    MEDICATIONS  (PRN):  acetaminophen   Tablet .. 650 milliGRAM(s) Oral every 6 hours PRN Temp greater or equal to 38C (100.4F)  ALBUTerol    90 MICROgram(s) HFA Inhaler 2 Puff(s) Inhalation every 6 hours PRN Shortness of Breath and/or Wheezing  benzonatate 100 milliGRAM(s) Oral three times a day PRN Cough  guaiFENesin   Syrup  (Sugar-Free) 100 milliGRAM(s) Oral every 6 hours PRN Cough  sodium chloride 0.65% Nasal 1 Spray(s) Both Nostrils every 8 hours PRN Nasal Congestion      EXAM:  Vital Signs Last 24 Hrs  T(C): 36.6 (02 Apr 2020 10:09), Max: 37.6 (01 Apr 2020 21:42)  T(F): 97.8 (02 Apr 2020 10:09), Max: 99.6 (01 Apr 2020 21:42)  HR: 87 (02 Apr 2020 10:09) (87 - 100)  BP: 137/84 (02 Apr 2020 10:09) (136/81 - 150/79)  BP(mean): --  RR: 20 (02 Apr 2020 10:09) (20 - 20)  SpO2: 90% (02 Apr 2020 10:09) (90% - 100%)    GENERAL: The patient is awake and alert in no apparent distress.     SKIN: Warm, dry, no rashes    LUNGS: Clear to auscultation without wheezing, rales or rhonchi; respirations unlabored    HEART: Regular rate and rhythm without murmur.    ABDOMEN: +BS, Soft, Nontender    EXTREMITIES: No clubbing, cyanosis, edema    Ferritin 8098  CRP 16 and rising                          13.9   7.02  )-----------( 249      ( 01 Apr 2020 08:01 )             41.6       04-01    131<L>  |  89<L>  |  9   ----------------------------<  114<H>  3.8   |  25  |  0.78    Ca    9.2      01 Apr 2020 07:54    TPro  7.8  /  Alb  4.0  /  TBili  0.8  /  DBili  x   /  AST  186<H>  /  ALT  229<H>  /  AlkPhos  192<H>  04-01      LIVER FUNCTIONS - ( 01 Apr 2020 07:54 )  Alb: 4.0 g/dL / Pro: 7.8 g/dL / ALK PHOS: 192 U/L / ALT: 229 U/L / AST: 186 U/L / GGT: x               PROBLEM LIST:  49y Male with HEALTH ISSUES - PROBLEM Dx:  COVID pneumonia with rising markers        RECS: add cristian Hopper MD  [phone]

## 2020-04-02 NOTE — PROGRESS NOTE ADULT - SUBJECTIVE AND OBJECTIVE BOX
SUBJECTIVE / OVERNIGHT EVENTS: pt c/o shortness of breath     MEDICATIONS  (STANDING):  enoxaparin Injectable 40 milliGRAM(s) SubCutaneous daily  hydroxychloroquine   Oral   hydroxychloroquine 200 milliGRAM(s) Oral every 12 hours  methylPREDNISolone sodium succinate Injectable 40 milliGRAM(s) IV Push every 12 hours    MEDICATIONS  (PRN):  acetaminophen   Tablet .. 650 milliGRAM(s) Oral every 6 hours PRN Temp greater or equal to 38C (100.4F)  ALBUTerol    90 MICROgram(s) HFA Inhaler 2 Puff(s) Inhalation every 6 hours PRN Shortness of Breath and/or Wheezing  benzonatate 100 milliGRAM(s) Oral three times a day PRN Cough  guaiFENesin   Syrup  (Sugar-Free) 100 milliGRAM(s) Oral every 6 hours PRN Cough  sodium chloride 0.65% Nasal 1 Spray(s) Both Nostrils every 8 hours PRN Nasal Congestion    Vital Signs Last 24 Hrs  T(C): 36.6 (2020 10:09), Max: 37.6 (2020 21:42)  T(F): 97.8 (2020 10:09), Max: 99.6 (2020 21:42)  HR: 87 (2020 10:09) (87 - 100)  BP: 137/84 (2020 10:09) (136/81 - 150/79)  BP(mean): --  RR: 20 (2020 10:09) (20 - 20)  SpO2: 90% (2020 10:09) (90% - 100%)    Constitutional: No fever, fatigue  Skin: No rash.  Eyes: No recent vision problems or eye pain.  ENT: No congestion, ear pain, or sore throat.  Cardiovascular: No chest pain or palpation.  Respiratory: No cough, shortness of breath, congestion, or wheezing.  Gastrointestinal: No abdominal pain, nausea, vomiting, or diarrhea.  Genitourinary: No dysuria.  Musculoskeletal: No joint swelling.  Neurologic: No headache.    PHYSICAL EXAM:  GENERAL: NAD  EYES: EOMI, PERRLA  NECK: Supple, No JVD  CHEST/LUNG: dec breath sounds at bases  HEART:  S1 , S2 +  ABDOMEN: soft , bs+  EXTREMITIES:  no edema  NEUROLOGY:alert awake oriented   LABS:      131<L>  |  89<L>  |  9   ----------------------------<  114<H>  3.8   |  25  |  0.78    Ca    9.2      2020 07:54    TPro  7.8  /  Alb  4.0  /  TBili  0.8  /  DBili      /  AST  186<H>  /  ALT  229<H>  /  AlkPhos  192<H>  04-01    Creatinine Trend: 0.78 <--, 0.82 <--, 0.79 <--                        13.9   7.02  )-----------( 249      ( 2020 08:01 )             41.6     Urine Studies:  Urinalysis Basic - ( 29 Mar 2020 01:58 )    Color: Light Yellow / Appearance: Clear / S.012 / pH:   Gluc:  / Ketone: Negative  / Bili: Negative / Urobili: Negative   Blood:  / Protein: 30 mg/dL / Nitrite: Negative   Leuk Esterase: Negative / RBC: 0 /hpf / WBC 1 /HPF   Sq Epi:  / Non Sq Epi: 0 /hpf / Bacteria: Negative              LIVER FUNCTIONS - ( 2020 07:54 )  Alb: 4.0 g/dL / Pro: 7.8 g/dL / ALK PHOS: 192 U/L / ALT: 229 U/L / AST: 186 U/L / GGT: x               Sq Epi:  / Non Sq Epi: 0 /hpf / Bacteria: Negative              LIVER FUNCTIONS - ( 2020 07:54 )  Alb: 4.0 g/dL / Pro: 7.8 g/dL / ALK PHOS: 192 U/L / ALT: 229 U/L / AST: 186 U/L / GGT: x             Imaging Personally Reviewed:    Consultant(s) Notes Reviewed:      Care Discussed with Consultants/Other Providers:

## 2020-04-02 NOTE — PROGRESS NOTE ADULT - ASSESSMENT
49 y.o. M w/ no significant PMHx p/w fevers, cough, myalgias, headache since six days.    Sepsis, fever, tachycardia, COVID-19 infection, pneumonia, transaminitis. hypoxia       Plan:   c/w with hydroxychloroquine  does not meet criteria for IL-6 trial due to LFT's   trend LFT's   Recommend CT angio chest to ensure no PE given high ferritin and LDH.   PCT in am   F/U Prelim blood cx, NTD 49 y.o. M w/ no significant PMHx p/w fevers, cough, myalgias, headache since six days.    Sepsis, fever, tachycardia, COVID-19 infection, pneumonia, transaminitis. hypoxia       Plan:   c/w with hydroxychloroquine  does not meet criteria for IL-6 trial due to LFT's   trend LFT's   Recommend CT angio chest to ensure no PE given high ferritin and LDH.   PCT slightly elevated  F/U Prelim blood cx, NTD   Pulm on board, started on Anakinra, steroids.

## 2020-04-02 NOTE — PROGRESS NOTE ADULT - SUBJECTIVE AND OBJECTIVE BOX
49y old  Male who presents with a chief complaint of fever , cough , shortness of breath (02 Apr 2020 12:51)      Interval history:  Afebrile, still coughing, hypoxic when talks even on NRB.       Allergies:   No Known Drug Allergies  pollen (Rhinorrhea)      Antimicrobials:  hydroxychloroquine 200 milliGRAM(s) Oral every 12 hours      REVIEW OF SYSTEMS:  No chest pain  No N/V, no BM today   No dysuria   No rash.     Vital Signs Last 24 Hrs  T(C): 36.6 (04-02-20 @ 10:09), Max: 37.6 (04-01-20 @ 21:42)  T(F): 97.8 (04-02-20 @ 10:09), Max: 99.6 (04-01-20 @ 21:42)  HR: 87 (04-02-20 @ 10:09) (87 - 100)  BP: 137/84 (04-02-20 @ 10:09) (136/81 - 150/79)  BP(mean): --  RR: 20 (04-02-20 @ 10:09) (20 - 20)  SpO2: 90% (04-02-20 @ 10:09) (90% - 100%)      PHYSICAL EXAM:  Patient in no acute distress. AAOX3, no NRB   Cardiovascular: S1S2 normal.  Lungs: + air entry B/L lung fields.  Gastrointestinal: soft, nontender, nondistended.  Extremities: no edema.  IV sites not inflamed.                           13.9   7.02  )-----------( 249      ( 01 Apr 2020 08:01 )             41.6   04-01    131<L>  |  89<L>  |  9   ----------------------------<  114<H>  3.8   |  25  |  0.78    Ca    9.2      01 Apr 2020 07:54    TPro  7.8  /  Alb  4.0  /  TBili  0.8  /  DBili  x   /  AST  186<H>  /  ALT  229<H>  /  AlkPhos  192<H>  04-01      LIVER FUNCTIONS - ( 01 Apr 2020 07:54 )  Alb: 4.0 g/dL / Pro: 7.8 g/dL / ALK PHOS: 192 U/L / ALT: 229 U/L / AST: 186 U/L / GGT: x             Culture - Blood (03.29.20 @ 05:08)    Specimen Source: .Blood Blood-Peripheral    Culture Results:   No Growth Final

## 2020-04-02 NOTE — DIETITIAN INITIAL EVALUATION ADULT. - PHYSICAL APPEARANCE
other (specify) Ht: 5'7" Wt: 180 pounds (dosing weight) BMI: 28.2 kg/m2 IBW: 148 pounds (+/-10%) 122%IBW  Edema: none noted  Skin per nursing flowsheets: no pressure injuries noted

## 2020-04-02 NOTE — DIETITIAN INITIAL EVALUATION ADULT. - PERTINENT MEDS FT
MEDICATIONS  (STANDING):  anakinra Injectable 100 milliGRAM(s) SubCutaneous <User Schedule>  enoxaparin Injectable 40 milliGRAM(s) SubCutaneous daily  hydroxychloroquine   Oral   hydroxychloroquine 200 milliGRAM(s) Oral every 12 hours  methylPREDNISolone sodium succinate Injectable 40 milliGRAM(s) IV Push every 12 hours

## 2020-04-02 NOTE — DIETITIAN INITIAL EVALUATION ADULT. - OTHER INFO
49M c no significant PMHx p/w fevers, cough, myalgias, headache since six days, and persistent diarrhea for 3 days. COVID-19+. Per Care coordination note, pt now receiving O2 therapy via non rebreather mask and NC.    Unable to conduct a face to face interview or nutrition-focused physical exam due to limited contact restrictions related to Pt's medical condition and isolation precautions.     Ap/PO    Weights: Per [facility] Growth Charts pt wt: 192 pounds (11/26/19), 190 pounds (12/31/19), 192 pounds (1/14/2020); current admission dosing weight: 179.8 pounds (3/28), however no bed scale/standing scale weights obtained upon admission.    No GI distress noted per documents, last BM 3/31.    Per H&P, NKFA, and no micronutrient supplementation PTA 49M c no significant PMHx p/w fevers, cough, myalgias, headache since six days, and persistent diarrhea for 3 days. COVID-19+. currently receiving 15L NRB, 10L NC.    Unable to conduct a face to face interview or nutrition-focused physical exam due to limited contact restrictions related to Pt's medical condition and isolation precautions. Brief interview conducted via phone call with patient; pt bonnie SOB. Notes he was eating well PTA and is consuming at least 50% at meals in-house; amenable to receiving Mighty Shakes in-house. Pt unable to answer more questions, made aware RD remains available PRN.    Unable to retrieve weight history from Pt; Weights Per [facility] Growth Charts pt wt: 192 pounds (11/26/19), 190 pounds (12/31/19), 192 pounds (1/14/2020); current admission dosing weight: 179.8 pounds (3/28), however no bed scale/standing scale weights obtained upon admission. No GI distress noted per documents, last BM 3/31. Per H&P, NKFA, and no micronutrient supplementation PTA.

## 2020-04-03 NOTE — CHART NOTE - NSCHARTNOTEFT_GEN_A_CORE
CHIEF COMPLAINT: fever, cough and SOB    HPI: Patient is a 49M w/ no significant PMHx p/w fevers, cough, myalgias, and headache for the six days. He endorses sick contacts at home w/ family members, nobody has been tested for covid.  Pt. called PMD on Monday who stated to watch sxs and go to ED if he becomes SOB. Pt started developing SOB yesterday and today (3/29) had tmax of 103.  Pt has been taking tylenol around the clock w/ good relief of sxs.  Pt. has also had persistent diarrhea for the last three days.  Pt denies any respiratory or smoking hx. RRT was called on 4/3 for hypoxia. He was on NRB 15L and was hypoxic in the high 70s. He was placed in prone position, but he kept pulling his NRB and had two episodes of emesis. He was subsequently supined and placed on NRB15L with NC 10L. However, he continued to pull his NRB and desatted to mid-80s. He was subsequently intubated.       Above reviewed;   Pt complains of SOB, some cough, diarrhea for last 4 days.       PAST MEDICAL & SURGICAL HISTORY:  History of umbilical hernia  H/O right knee surgery: 2004 - ACL, meniscus tear  History of left knee surgery: 1992 - ACL, meniscus tear    Patient Currently Takes Medications as of 08-Jan-2020 09:32 documented in Structured Notes  · oxyCODONE 5 mg oral tablet: 1 tab(s) orally every 6 hours, As Needed MDD:4   · Motrin 300 mg oral tablet: 200 milligram(s) orally , As Needed      FAMILY HISTORY:      SOCIAL HISTORY:  Smoking: [ ] Never Smoked [ ] Former Smoker (__ packs x ___ years) [ ] Current Smoker  (__ packs x ___ years)  Substance Use: [ ] Never Used [ ] Used ____  EtOH Use:  Marital Status: [ ] Single [ ]  [ ]  [ ]   Sexual History:   Occupation:  Recent Travel:  Country of Birth:  Advance Directives:    Allergies    No Known Drug Allergies  pollen (Rhinorrhea)    Intolerances        HOME MEDICATIONS:    REVIEW OF SYSTEMS:  [ ] Unable to assess ROS because patient is intubated    OBJECTIVE:  ICU Vital Signs Last 24 Hrs  T(C): 36.8 (03 Apr 2020 01:04), Max: 36.8 (03 Apr 2020 01:04)  T(F): 98.3 (03 Apr 2020 01:04), Max: 98.3 (03 Apr 2020 01:04)  HR: 79 (03 Apr 2020 01:04) (79 - 94)  BP: 124/72 (03 Apr 2020 01:04) (110/57 - 150/79)  BP(mean): --  ABP: --  ABP(mean): --  RR: 30 (03 Apr 2020 01:04) (20 - 30)  SpO2: 88% (03 Apr 2020 01:04) (88% - 93%)        04-01 @ 07:01  -  04-02 @ 07:00  --------------------------------------------------------  IN: 350 mL / OUT: 2950 mL / NET: -2600 mL    04-02 @ 07:01  - 04-03 @ 04:55  --------------------------------------------------------  IN: 970 mL / OUT: 550 mL / NET: 420 mL      CAPILLARY BLOOD GLUCOSE      POCT Blood Glucose.: 195 mg/dL (03 Apr 2020 01:21)      PHYSICAL EXAM:  GENERAL: in respiratory distress  HEAD:  Atraumatic, Normocephalic  EYES: EOMI, PERRLA, conjunctiva and sclera clear  ENT: Moist mucous membranes  NECK: Supple, No JVD  CHEST/LUNG: Clear to auscultation bilaterally; No rales, rhonchi, wheezing, or rubs. Unlabored respirations  HEART: Regular rate and rhythm; No murmurs, rubs, or gallops  ABDOMEN: Bowel sounds present; Soft, Nontender, Nondistended. No hepatomegaly  EXTREMITIES:  2+ Peripheral Pulses, brisk capillary refill. No clubbing, cyanosis, or edema  NERVOUS SYSTEM:  Alert & Oriented X3, speech clear. No deficits   MSK: FROM all 4 extremities, full and equal strength  SKIN: No rashes or lesions    LINES:     HOSPITAL MEDICATIONS:  MEDICATIONS  (STANDING):  anakinra Injectable 100 milliGRAM(s) SubCutaneous <User Schedule>  enoxaparin Injectable 40 milliGRAM(s) SubCutaneous daily  methylPREDNISolone sodium succinate Injectable 40 milliGRAM(s) IV Push every 12 hours  potassium chloride  10 mEq/100 mL IVPB 10 milliEquivalent(s) IV Intermittent every 1 hour    MEDICATIONS  (PRN):  acetaminophen   Tablet .. 650 milliGRAM(s) Oral every 6 hours PRN Temp greater or equal to 38C (100.4F)  ALBUTerol    90 MICROgram(s) HFA Inhaler 2 Puff(s) Inhalation every 6 hours PRN Shortness of Breath and/or Wheezing  benzonatate 100 milliGRAM(s) Oral three times a day PRN Cough  guaiFENesin   Syrup  (Sugar-Free) 100 milliGRAM(s) Oral every 6 hours PRN Cough  sodium chloride 0.65% Nasal 1 Spray(s) Both Nostrils every 8 hours PRN Nasal Congestion      LABS:                        13.9   7.02  )-----------( 249      ( 01 Apr 2020 08:01 )             41.6     Hgb Trend: 13.9<--, 13.5<--, 13.3<--  04-01    131<L>  |  89<L>  |  9   ----------------------------<  114<H>  3.8   |  25  |  0.78    Ca    9.2      01 Apr 2020 07:54    TPro  7.8  /  Alb  4.0  /  TBili  0.8  /  DBili  x   /  AST  186<H>  /  ALT  229<H>  /  AlkPhos  192<H>  04-01    Creatinine Trend: 0.78<--, 0.82<--, 0.79<--      Arterial Blood Gas:  04-03 @ 03:24  7.42/39/62/25/91/1.4  ABG lactate: --        MICROBIOLOGY:     RADIOLOGY:  1) CTA chest (4/2)- IMPRESSION:    No pulmonary embolus to the level of the lobar arteries. Smaller vessels cannot be evaluated due to respiratory motion.   Diffuse ground-glass opacities in both lungs with increased consolidations in the periphery, in keeping with known COVID-19 infection.     EKG: Sinus tach to 104 bpm. QTc- 418 ms    Assessment/Plan:  49 y.o. M w/ no significant PMHx p/w fevers, cough, myalgias, headache found to have acute hypoxic respiratory failure 2/2 COVID19, s/p intubation on 4/3.    #Neuro  - AxO x 3 at baseline, concern for delirium 2/2 unclear cause. DDx- microthrombi 2/2 COVID19 vs hyperammonemia vs hypercapnia, vs seizure vs stroke vs hyponatremia  - intubated and sedated  - propofol  - F/u ammonia level    #Resp  - Acute Hypoxic Respiratory failure 2/2 COVID19 (possible terminal bronchial obstruction)- intubated on 4/3  ABG showed pH- 7.42, pCO2- 39 and pO2- 62  - CTA- No pulmonary embolus to the level of the lobar arteries. Diffuse ground-glass opacities in both lungs with increased consolidations in the periphery, in keeping with known COVID-19 infection.   - current vent settings- TV- , RR- , FiO2- and PEEP-   - F/u repeat ABG  - will prone 18h and supine 6h    #CV  - no active issues    #GI  - will keep NPO for now  - s/p reglan 10 mg x 2 for NBNB emesis    #Transaminitis  - Likely 2/2 sepsis 2/2 COVID19  - will continue to monitor  - if uptrending, will consider obtaining US RUQ Abd  -    #ID  - Urine and Blood cx (3/29)- NGTD, Rapid Flu- negative and Legionella- negative  - COVID19 PCR positive- 3/29  - F/u COVID-19 workups:     -- On Admission: CBC w/ diff (lymphopenia common), CMP/mg/phos, Coags, D-dimer, procalcitonin, ESR/CRP, LDH, ferritin, lactate, T cell subset, CK, Trop, G6PD, natural killer cells and IL6/8; Lung scores and cardiac ultrasound    -- Daily: CBC w/ diff, CMP/mg/phos, CRP, Lung scores and cardiac ultrasound    -- Every 3 days: ESR, T cell subset, D-dimer, LDH, Ferritin, CK, trop and coags  - s/p Plaquenil, x 4 days, CTX/AZT (3/29)  - Patient is currently on Anakinra (s/p 2 doses)  - will start Vitamin C 1500 mg Q6H and IV thiamine 200 mgQ12H, x 5 days  - c/w solu-medrol 40 mg BID (D2)  - Per ID recs, does not meet criteria for IL-6 2/2 LFTs    #Renal  - no active issues, monitor I/O  - Cr- 0.78    #Heme  - H/H stable  - no active issues    #Endo  - no active issues  - will start LDSSI and monitor FS Q6H    #DVT PPx  - heparin gtt with bolus    #GOC/Ethics CHIEF COMPLAINT: fever, cough and SOB    HPI: Patient is a 49M w/ no significant PMHx p/w fevers, cough, myalgias, and headache for the six days. He endorses sick contacts at home w/ family members, nobody has been tested for covid.  Pt. called PMD on Monday who stated to watch sxs and go to ED if he becomes SOB. Pt started developing SOB yesterday and today (3/29) had tmax of 103.  Pt has been taking tylenol around the clock w/ good relief of sxs.  Pt. has also had persistent diarrhea for the last three days.  Pt denies any respiratory or smoking hx. RRT was called on 4/3 for hypoxia. He was on NRB 15L and was hypoxic in the high 70s. He was placed in prone position, but he kept pulling his NRB and had two episodes of emesis. He was subsequently supined and placed on NRB15L with NC 10L. However, he continued to pull his NRB and desatted to mid-80s. He was subsequently intubated.       Above reviewed;   Pt complains of SOB, some cough, diarrhea for last 4 days.       PAST MEDICAL & SURGICAL HISTORY:  History of umbilical hernia  H/O right knee surgery: 2004 - ACL, meniscus tear  History of left knee surgery: 1992 - ACL, meniscus tear    Patient Currently Takes Medications as of 08-Jan-2020 09:32 documented in Structured Notes  · oxyCODONE 5 mg oral tablet: 1 tab(s) orally every 6 hours, As Needed MDD:4   · Motrin 300 mg oral tablet: 200 milligram(s) orally , As Needed      FAMILY HISTORY:      SOCIAL HISTORY:  Smoking: [ ] Never Smoked [ ] Former Smoker (__ packs x ___ years) [ ] Current Smoker  (__ packs x ___ years)  Substance Use: [ ] Never Used [ ] Used ____  EtOH Use:  Marital Status: [ ] Single [ ]  [ ]  [ ]   Sexual History:   Occupation:  Recent Travel:  Country of Birth:  Advance Directives:    Allergies    No Known Drug Allergies  pollen (Rhinorrhea)    Intolerances        HOME MEDICATIONS:    REVIEW OF SYSTEMS:  [ ] Unable to assess ROS because patient is intubated    OBJECTIVE:  ICU Vital Signs Last 24 Hrs  T(C): 36.8 (03 Apr 2020 01:04), Max: 36.8 (03 Apr 2020 01:04)  T(F): 98.3 (03 Apr 2020 01:04), Max: 98.3 (03 Apr 2020 01:04)  HR: 79 (03 Apr 2020 01:04) (79 - 94)  BP: 124/72 (03 Apr 2020 01:04) (110/57 - 150/79)  BP(mean): --  ABP: --  ABP(mean): --  RR: 30 (03 Apr 2020 01:04) (20 - 30)  SpO2: 88% (03 Apr 2020 01:04) (88% - 93%)        04-01 @ 07:01  -  04-02 @ 07:00  --------------------------------------------------------  IN: 350 mL / OUT: 2950 mL / NET: -2600 mL    04-02 @ 07:01  - 04-03 @ 04:55  --------------------------------------------------------  IN: 970 mL / OUT: 550 mL / NET: 420 mL      CAPILLARY BLOOD GLUCOSE      POCT Blood Glucose.: 195 mg/dL (03 Apr 2020 01:21)      PHYSICAL EXAM:  GENERAL: in respiratory distress  HEAD:  Atraumatic, Normocephalic  EYES: EOMI, PERRLA, conjunctiva and sclera clear  ENT: Moist mucous membranes  NECK: Supple, No JVD  CHEST/LUNG: Clear to auscultation bilaterally; No rales, rhonchi, wheezing, or rubs. Unlabored respirations  HEART: Regular rate and rhythm; No murmurs, rubs, or gallops  ABDOMEN: Bowel sounds present; Soft, Nontender, Nondistended. No hepatomegaly  EXTREMITIES:  2+ Peripheral Pulses, brisk capillary refill. No clubbing, cyanosis, or edema  NERVOUS SYSTEM:  Alert & Oriented X3, speech clear. No deficits   MSK: FROM all 4 extremities, full and equal strength  SKIN: No rashes or lesions    LINES:     HOSPITAL MEDICATIONS:  MEDICATIONS  (STANDING):  anakinra Injectable 100 milliGRAM(s) SubCutaneous <User Schedule>  enoxaparin Injectable 40 milliGRAM(s) SubCutaneous daily  methylPREDNISolone sodium succinate Injectable 40 milliGRAM(s) IV Push every 12 hours  potassium chloride  10 mEq/100 mL IVPB 10 milliEquivalent(s) IV Intermittent every 1 hour    MEDICATIONS  (PRN):  acetaminophen   Tablet .. 650 milliGRAM(s) Oral every 6 hours PRN Temp greater or equal to 38C (100.4F)  ALBUTerol    90 MICROgram(s) HFA Inhaler 2 Puff(s) Inhalation every 6 hours PRN Shortness of Breath and/or Wheezing  benzonatate 100 milliGRAM(s) Oral three times a day PRN Cough  guaiFENesin   Syrup  (Sugar-Free) 100 milliGRAM(s) Oral every 6 hours PRN Cough  sodium chloride 0.65% Nasal 1 Spray(s) Both Nostrils every 8 hours PRN Nasal Congestion      LABS:                        13.9   7.02  )-----------( 249      ( 01 Apr 2020 08:01 )             41.6     Hgb Trend: 13.9<--, 13.5<--, 13.3<--  04-01    131<L>  |  89<L>  |  9   ----------------------------<  114<H>  3.8   |  25  |  0.78    Ca    9.2      01 Apr 2020 07:54    TPro  7.8  /  Alb  4.0  /  TBili  0.8  /  DBili  x   /  AST  186<H>  /  ALT  229<H>  /  AlkPhos  192<H>  04-01    Creatinine Trend: 0.78<--, 0.82<--, 0.79<--      Arterial Blood Gas:  04-03 @ 03:24  7.42/39/62/25/91/1.4  ABG lactate: --        MICROBIOLOGY:     RADIOLOGY:  1) CTA chest (4/2)- IMPRESSION:    No pulmonary embolus to the level of the lobar arteries. Smaller vessels cannot be evaluated due to respiratory motion.   Diffuse ground-glass opacities in both lungs with increased consolidations in the periphery, in keeping with known COVID-19 infection.     EKG: Sinus tach to 104 bpm. QTc- 418 ms    Assessment/Plan:  49 y.o. M w/ no significant PMHx p/w fevers, cough, myalgias, headache found to have acute hypoxic respiratory failure 2/2 COVID19, s/p intubation on 4/3.    #Neuro  - AxO x 3 at baseline, concern for delirium 2/2 unclear cause. DDx- microthrombi 2/2 COVID19 vs hyperammonemia vs hypercapnia, vs seizure vs stroke vs hyponatremia  - intubated and sedated  - propofol  - F/u ammonia level  -F/u CTH given mental status changes. If negative, can start full dose AC    #Resp  - Acute Hypoxic Respiratory failure 2/2 COVID19 (possible terminal bronchial obstruction)- intubated on 4/3  ABG showed pH- 7.42, pCO2- 39 and pO2- 62  - CTA- No pulmonary embolus to the level of the lobar arteries. Diffuse ground-glass opacities in both lungs with increased consolidations in the periphery, in keeping with known COVID-19 infection.   - current vent settings- TV- , RR- , FiO2- and PEEP-   - F/u repeat ABG  - will prone 18h and supine 6h    #CV  - no active issues    #GI  - will keep NPO for now  - s/p reglan 10 mg x 2 for NBNB emesis    #Transaminitis  - Likely 2/2 sepsis 2/2 COVID19  - will continue to monitor  - if uptrending, will consider obtaining US RUQ Abd  - AXR to r/o ileus given NBNB emesis    #ID  - Urine and Blood cx (3/29)- NGTD, Rapid Flu- negative and Legionella- negative  - COVID19 PCR positive- 3/29  - F/u COVID-19 workups:     -- On Admission: CBC w/ diff, CMP/mg/phos, Coags, D-dimer, procalcitonin, ESR/CRP, LDH, ferritin, lactate, T cell subset, CK, Trop, G6PD, natural killer cells and IL6/8; Lung scores and cardiac ultrasound    -- Daily: CBC w/ diff, CMP/mg/phos, fibrinogen, and CRP    -- Every 3 days: T cell subset, D-dimer, LDH, Ferritin, CK, trop and coags  - s/p Plaquenil, x 4 days, CTX/AZT (3/29). Per ID recs, does not meet criteria for IL-6 2/2 LFTs  - Patient is currently on Anakinra (s/p 2 doses)  - c/w solu-medrol 40 mg BID (D2)    #Renal  - no active issues, monitor I/O  - Cr- 0.78  - IV Lasix 40 mg for goal negative 2L  - monitor bmp    #Heme  - H/H stable  - no active issues    #Endo  - no active issues  - will start LDSSI and monitor FS Q6H    #DVT PPx  - heparin gtt with bolus given hypercoagulable state with COVID19 (given elevated ferritin level)  - F/u D-dimer    #GOC/Ethics CHIEF COMPLAINT: fever, cough and SOB    HPI: Patient is a 49M w/ no significant PMHx p/w fevers, cough, myalgias, and headache for the six days. He endorses sick contacts at home w/ family members, nobody has been tested for covid.  Pt. called PMD on Monday who stated to watch sxs and go to ED if he becomes SOB. Pt started developing SOB yesterday and today (3/29) had tmax of 103.  Pt has been taking tylenol around the clock w/ good relief of sxs.  Pt. has also had persistent diarrhea for the last three days.  Pt denies any respiratory or smoking hx. RRT was called on 4/3 for hypoxia. He was on NRB 15L and was hypoxic in the high 70s. He was placed in prone position, but he kept pulling his NRB and had two episodes of emesis. He was subsequently supined and placed on NRB15L with NC 10L. However, he continued to pull his NRB and desatted to mid-80s. He was subsequently intubated.       Above reviewed;   Pt complains of SOB, some cough, diarrhea for last 4 days.       PAST MEDICAL & SURGICAL HISTORY:  History of umbilical hernia  H/O right knee surgery: 2004 - ACL, meniscus tear  History of left knee surgery: 1992 - ACL, meniscus tear    Patient Currently Takes Medications as of 08-Jan-2020 09:32 documented in Structured Notes  · oxyCODONE 5 mg oral tablet: 1 tab(s) orally every 6 hours, As Needed MDD:4   · Motrin 300 mg oral tablet: 200 milligram(s) orally , As Needed      FAMILY HISTORY:      SOCIAL HISTORY:  Smoking: [ ] Never Smoked [ ] Former Smoker (__ packs x ___ years) [ ] Current Smoker  (__ packs x ___ years)  Substance Use: [ ] Never Used [ ] Used ____  EtOH Use:  Marital Status: [ ] Single [ ]  [ ]  [ ]   Sexual History:   Occupation:  Recent Travel:  Country of Birth:  Advance Directives:    Allergies    No Known Drug Allergies  pollen (Rhinorrhea)    Intolerances        HOME MEDICATIONS:    REVIEW OF SYSTEMS:  [ ] Unable to assess ROS because patient is intubated    OBJECTIVE:  ICU Vital Signs Last 24 Hrs  T(C): 36.8 (03 Apr 2020 01:04), Max: 36.8 (03 Apr 2020 01:04)  T(F): 98.3 (03 Apr 2020 01:04), Max: 98.3 (03 Apr 2020 01:04)  HR: 79 (03 Apr 2020 01:04) (79 - 94)  BP: 124/72 (03 Apr 2020 01:04) (110/57 - 150/79)  BP(mean): --  ABP: --  ABP(mean): --  RR: 30 (03 Apr 2020 01:04) (20 - 30)  SpO2: 88% (03 Apr 2020 01:04) (88% - 93%)        04-01 @ 07:01  -  04-02 @ 07:00  --------------------------------------------------------  IN: 350 mL / OUT: 2950 mL / NET: -2600 mL    04-02 @ 07:01  -  04-03 @ 04:55  --------------------------------------------------------  IN: 970 mL / OUT: 550 mL / NET: 420 mL      CAPILLARY BLOOD GLUCOSE      POCT Blood Glucose.: 195 mg/dL (03 Apr 2020 01:21)      PHYSICAL EXAM:  GENERAL: in respiratory distress  HEAD:  Atraumatic, Normocephalic  EYES: EOMI, PERRLA, conjunctiva and sclera clear  ENT: Moist mucous membranes  NECK: Supple, No JVD  CHEST/LUNG: Clear to auscultation bilaterally; No rales, rhonchi, wheezing, or rubs. Unlabored respirations  HEART: Regular rate and rhythm; No murmurs, rubs, or gallops  ABDOMEN: Bowel sounds present; Soft, Nontender, Nondistended. No hepatomegaly  EXTREMITIES:  2+ Peripheral Pulses, brisk capillary refill. No clubbing, cyanosis, or edema  NERVOUS SYSTEM:  Alert & Oriented X0  MSK: unable to assess  SKIN: No rashes or lesions    LINES:     HOSPITAL MEDICATIONS:  MEDICATIONS  (STANDING):  anakinra Injectable 100 milliGRAM(s) SubCutaneous <User Schedule>  enoxaparin Injectable 40 milliGRAM(s) SubCutaneous daily  methylPREDNISolone sodium succinate Injectable 40 milliGRAM(s) IV Push every 12 hours  potassium chloride  10 mEq/100 mL IVPB 10 milliEquivalent(s) IV Intermittent every 1 hour    MEDICATIONS  (PRN):  acetaminophen   Tablet .. 650 milliGRAM(s) Oral every 6 hours PRN Temp greater or equal to 38C (100.4F)  ALBUTerol    90 MICROgram(s) HFA Inhaler 2 Puff(s) Inhalation every 6 hours PRN Shortness of Breath and/or Wheezing  benzonatate 100 milliGRAM(s) Oral three times a day PRN Cough  guaiFENesin   Syrup  (Sugar-Free) 100 milliGRAM(s) Oral every 6 hours PRN Cough  sodium chloride 0.65% Nasal 1 Spray(s) Both Nostrils every 8 hours PRN Nasal Congestion      LABS:                        13.9   7.02  )-----------( 249      ( 01 Apr 2020 08:01 )             41.6     Hgb Trend: 13.9<--, 13.5<--, 13.3<--  04-01    131<L>  |  89<L>  |  9   ----------------------------<  114<H>  3.8   |  25  |  0.78    Ca    9.2      01 Apr 2020 07:54    TPro  7.8  /  Alb  4.0  /  TBili  0.8  /  DBili  x   /  AST  186<H>  /  ALT  229<H>  /  AlkPhos  192<H>  04-01    Creatinine Trend: 0.78<--, 0.82<--, 0.79<--      Arterial Blood Gas:  04-03 @ 03:24  7.42/39/62/25/91/1.4  ABG lactate: --        MICROBIOLOGY:     RADIOLOGY:  1) CTA chest (4/2)- IMPRESSION:    No pulmonary embolus to the level of the lobar arteries. Smaller vessels cannot be evaluated due to respiratory motion.   Diffuse ground-glass opacities in both lungs with increased consolidations in the periphery, in keeping with known COVID-19 infection.     EKG: Sinus tach to 104 bpm. QTc- 418 ms    Assessment/Plan:  49 y.o. M w/ no significant PMHx p/w fevers, cough, myalgias, headache found to have acute hypoxic respiratory failure 2/2 COVID19, s/p intubation on 4/3.    #Neuro  - AxO x 3 at baseline, concern for delirium 2/2 unclear cause. DDx- microthrombi 2/2 COVID19 vs hyperammonemia vs hypercapnia, vs seizure vs stroke vs hyponatremia  - intubated and sedated  - propofol  - F/u ammonia level  -F/u CTH given mental status changes. If negative, can start full dose AC    #Resp  - Acute Hypoxic Respiratory failure 2/2 COVID19 (possible terminal bronchial obstruction)- intubated on 4/3  ABG showed pH- 7.42, pCO2- 39 and pO2- 62  - CTA- No pulmonary embolus to the level of the lobar arteries. Diffuse ground-glass opacities in both lungs with increased consolidations in the periphery, in keeping with known COVID-19 infection.   - current vent settings- TV- , RR- , FiO2- and PEEP-   - F/u repeat ABG  - will prone 18h and supine 6h    #CV  - no active issues    #GI  - will keep NPO for now  - s/p reglan 10 mg x 2 for NBNB emesis    #Transaminitis  - Likely 2/2 sepsis 2/2 COVID19  - will continue to monitor  - if uptrending, will consider obtaining US RUQ Abd  - AXR to r/o ileus given NBNB emesis    #ID  - Urine and Blood cx (3/29)- NGTD, Rapid Flu- negative and Legionella- negative  - COVID19 PCR positive- 3/29  - F/u COVID-19 workups:     -- On Admission: CBC w/ diff, CMP/mg/phos, Coags, D-dimer, procalcitonin, ESR/CRP, LDH, ferritin, lactate, T cell subset, CK, Trop, G6PD, natural killer cells and IL6/8; Lung scores and cardiac ultrasound    -- Daily: CBC w/ diff, CMP/mg/phos, fibrinogen, and CRP    -- Every 3 days: T cell subset, D-dimer, LDH, Ferritin, CK, trop and coags  - s/p Plaquenil, x 4 days, CTX/AZT (3/29). Per ID recs, does not meet criteria for IL-6 2/2 LFTs  - Patient is currently on Anakinra (s/p 2 doses)  - c/w solu-medrol 40 mg BID (D2)    #Renal  - no active issues, monitor I/O  - Cr- 0.78  - IV Lasix 40 mg for goal negative 2L  - monitor bmp    #Heme  - H/H stable  - no active issues    #Endo  - no active issues  - will start LDSSI and monitor FS Q6H    #DVT PPx  - heparin gtt with bolus given hypercoagulable state with COVID19 (given elevated ferritin level)  - F/u D-dimer    #GOC/Ethics CHIEF COMPLAINT: fever, cough and SOB    HPI: Patient is a 49M w/ no significant PMHx p/w fevers, cough, myalgias, and headache for the six days. He endorses sick contacts at home w/ family members, nobody has been tested for Covid.  Pt called PMD on Monday who stated to watch sxs and go to ED if he becomes SOB. Pt started developing SOB yesterday and today (3/29) had tmax of 103.  Pt has been taking Tylenol around the clock w/ good relief of sxs. Pt has also had persistent diarrhea for the last three days. Pt denies any respiratory or smoking hx. RRT was called on 4/3 for hypoxia. He was on NRB 15L and was hypoxic in the high 70s. He was placed in prone position, but he kept pulling his NRB and had two episodes of emesis. He was subsequently supined and placed on NRB15L with NC 10L. However, he continued to pull his NRB and desatted to mid-80s. He was subsequently intubated and transferred to the MICU.      Above reviewed;   Pt complains of SOB, some cough, diarrhea for last 4 days.       PAST MEDICAL & SURGICAL HISTORY:  History of umbilical hernia  H/O right knee surgery: 2004 - ACL, meniscus tear  History of left knee surgery: 1992 - ACL, meniscus tear    Patient Currently Takes Medications as of 08-Jan-2020 09:32 documented in Structured Notes  · oxyCODONE 5 mg oral tablet: 1 tab(s) orally every 6 hours, As Needed MDD:4   · Motrin 300 mg oral tablet: 200 milligram(s) orally , As Needed      FAMILY HISTORY:      SOCIAL HISTORY:  Smoking: [ ] Never Smoked [ ] Former Smoker (__ packs x ___ years) [ ] Current Smoker  (__ packs x ___ years)  Substance Use: [ ] Never Used [ ] Used ____  EtOH Use:  Marital Status: [ ] Single [ ]  [ ]  [ ]   Sexual History:   Occupation:  Recent Travel:  Country of Birth:  Advance Directives:    Allergies    No Known Drug Allergies  pollen (Rhinorrhea)    Intolerances        HOME MEDICATIONS:    REVIEW OF SYSTEMS:  [ ] Unable to assess ROS because patient is intubated    OBJECTIVE:  ICU Vital Signs Last 24 Hrs  T(C): 36.8 (03 Apr 2020 01:04), Max: 36.8 (03 Apr 2020 01:04)  T(F): 98.3 (03 Apr 2020 01:04), Max: 98.3 (03 Apr 2020 01:04)  HR: 79 (03 Apr 2020 01:04) (79 - 94)  BP: 124/72 (03 Apr 2020 01:04) (110/57 - 150/79)  BP(mean): --  ABP: --  ABP(mean): --  RR: 30 (03 Apr 2020 01:04) (20 - 30)  SpO2: 88% (03 Apr 2020 01:04) (88% - 93%)        04-01 @ 07:01  -  04-02 @ 07:00  --------------------------------------------------------  IN: 350 mL / OUT: 2950 mL / NET: -2600 mL    04-02 @ 07:01 - 04-03 @ 04:55  --------------------------------------------------------  IN: 970 mL / OUT: 550 mL / NET: 420 mL      CAPILLARY BLOOD GLUCOSE      POCT Blood Glucose.: 195 mg/dL (03 Apr 2020 01:21)      PHYSICAL EXAM:  GENERAL: moderate respiratory distress  HEAD:  Atraumatic, Normocephalic  EYES: EOMI, PERRLA, conjunctiva and sclera clear  ENT: Moist mucous membranes  NECK: Supple, No JVD  CHEST/LUNG: Clear to auscultation bilaterally; use of accessory muscle noted  HEART: Regular rate and rhythm; No murmurs, rubs, or gallops  ABDOMEN: Bowel sounds present; Soft, Nontender, Nondistended. No hepatomegaly  EXTREMITIES:  2+ Peripheral Pulses, brisk capillary refill. No clubbing, cyanosis, or edema  NERVOUS SYSTEM:  Alert & Oriented X0  MSK: unable to assess  SKIN: No rashes or lesions    LINES:     HOSPITAL MEDICATIONS:  MEDICATIONS  (STANDING):  anakinra Injectable 100 milliGRAM(s) SubCutaneous <User Schedule>  enoxaparin Injectable 40 milliGRAM(s) SubCutaneous daily  methylPREDNISolone sodium succinate Injectable 40 milliGRAM(s) IV Push every 12 hours  potassium chloride  10 mEq/100 mL IVPB 10 milliEquivalent(s) IV Intermittent every 1 hour    MEDICATIONS  (PRN):  acetaminophen   Tablet .. 650 milliGRAM(s) Oral every 6 hours PRN Temp greater or equal to 38C (100.4F)  ALBUTerol    90 MICROgram(s) HFA Inhaler 2 Puff(s) Inhalation every 6 hours PRN Shortness of Breath and/or Wheezing  benzonatate 100 milliGRAM(s) Oral three times a day PRN Cough  guaiFENesin   Syrup  (Sugar-Free) 100 milliGRAM(s) Oral every 6 hours PRN Cough  sodium chloride 0.65% Nasal 1 Spray(s) Both Nostrils every 8 hours PRN Nasal Congestion      LABS:                        13.9   7.02  )-----------( 249      ( 01 Apr 2020 08:01 )             41.6     Hgb Trend: 13.9<--, 13.5<--, 13.3<--  04-01    131<L>  |  89<L>  |  9   ----------------------------<  114<H>  3.8   |  25  |  0.78    Ca    9.2      01 Apr 2020 07:54    TPro  7.8  /  Alb  4.0  /  TBili  0.8  /  DBili  x   /  AST  186<H>  /  ALT  229<H>  /  AlkPhos  192<H>  04-01    Creatinine Trend: 0.78<--, 0.82<--, 0.79<--      Arterial Blood Gas:  04-03 @ 03:24  7.42/39/62/25/91/1.4  ABG lactate: --        MICROBIOLOGY:     RADIOLOGY:  1) CTA chest (4/2)- IMPRESSION:    No pulmonary embolus to the level of the lobar arteries. Smaller vessels cannot be evaluated due to respiratory motion.   Diffuse ground-glass opacities in both lungs with increased consolidations in the periphery, in keeping with known COVID-19 infection.     EKG: Sinus tach to 104 bpm. QTc- 418 ms    Assessment/Plan:  49 y.o. M w/ no significant PMHx p/w fevers, cough, myalgias, headache found to have acute hypoxic respiratory failure 2/2 COVID19, s/p intubation on 4/3.    #Neuro  - AxO x 3 at baseline, concern for delirium 2/2 unclear cause. DDx- microthrombi 2/2 COVID19 vs hyperammonemia vs hypercapnia, vs seizure vs stroke vs hyponatremia  - intubated and sedated on propofol  - F/u ammonia level  - F/u CTH given acute onset mental status changes. If negative, can start full dose AC    #Resp  - Acute Hypoxic Respiratory failure 2/2 COVID19 (possible terminal bronchial obstruction)- intubated on 4/3  ABG showed pH- 7.42, pCO2- 39 and pO2- 62  - CTA- No pulmonary embolus to the level of the lobar arteries. Diffuse ground-glass opacities in both lungs with increased consolidations in the periphery, in keeping with known COVID-19 infection.   - current vent settings- TV-400 , RR- 20, FiO2- 70 and PEEP- 14  - F/u repeat ABG  - will prone 18h and supine 6h    #CV  - no active issues    #GI  - will keep NPO for now  - on senna and miralax  - s/p reglan 10 mg x 2 for NBNB emesis    #Transaminitis  - Likely 2/2 sepsis 2/2 COVID19  - will continue to monitor  - if uptrending, will consider obtaining US RUQ Abd  - AXR to r/o ileus given NBNB emesis    #ID  - Urine and Blood cx (3/29)- NGTD, Rapid Flu- negative and Legionella- negative  - COVID19 PCR positive- 3/29  - F/u COVID-19 workups:     -- On Admission: CBC w/ diff, CMP/mg/phos, Coags, D-dimer, procalcitonin, ESR/CRP, LDH, ferritin, lactate, T cell subset, CK, Trop, G6PD, natural killer cells and IL6/8; Lung scores and cardiac ultrasound    -- Daily: CBC w/ diff, CMP/mg/phos, fibrinogen, and CRP    -- Every 3 days: T cell subset, D-dimer, LDH, Ferritin, CK, trop and coags  - s/p Plaquenil, x 4 days, CTX/AZT (3/29). Per ID recs, does not meet criteria for IL-6 2/2 LFTs  - Patient is currently on Anakinra (s/p 2 doses). Will continue for total 3 days  - c/w Solu-Medrol 40 mg BID (D2/5)    #Renal  - no active issues, monitor I/O  - Cr- 0.78  - IV Lasix 40 mg for goal negative 2L  - monitor bmp    #Heme  - H/H stable  - no active issues    #Endo  - no active issues  - will start LDSSI and monitor FS Q6H    #DVT PPx  - heparin gtt with bolus given hypercoagulable state with COVID19 (given elevated ferritin level). If CTH negative for hemorrhage, can start heparin gtt full AC.   - F/u D-dimer    #GOC/Ethics- FULL CODE CHIEF COMPLAINT: fever, cough and SOB    HPI: Patient is a 49M w/ no significant PMHx p/w fevers, cough, myalgias, and headache for the six days. He endorses sick contacts at home w/ family members, nobody has been tested for Covid.  Pt called PMD on Monday who stated to watch sxs and go to ED if he becomes SOB. Pt started developing SOB yesterday and today (3/29) had tmax of 103.  Pt has been taking Tylenol around the clock w/ good relief of sxs. Pt has also had persistent diarrhea for the last three days. Pt denies any respiratory or smoking hx. RRT was called on 4/3 for hypoxia. He was on NRB 15L and was hypoxic in the high 70s. He was placed in prone position, but he kept pulling his NRB and had two episodes of emesis. He was subsequently supined and placed on NRB15L with NC 10L. However, he continued to pull his NRB and desatted to mid-80s. He was subsequently intubated and transferred to the MICU.      Above reviewed;   Pt complains of SOB, some cough, diarrhea for last 4 days.       PAST MEDICAL & SURGICAL HISTORY:  History of umbilical hernia- 1/2020  H/O right knee surgery: 2004 - ACL, meniscus tear  History of left knee surgery: 1992 - ACL, meniscus tear    Patient Currently Takes Medications as of 08-Jan-2020 09:32 documented in Structured Notes  · oxyCODONE 5 mg oral tablet: 1 tab(s) orally every 6 hours, As Needed MDD:4   · Motrin 300 mg oral tablet: 200 milligram(s) orally , As Needed      FAMILY HISTORY:      SOCIAL HISTORY:  Smoking: [ ] Never Smoked [ ] Former Smoker (__ packs x ___ years) [ ] Current Smoker  (__ packs x ___ years)  Substance Use: [ ] Never Used [ ] Used ____  EtOH Use:  Marital Status: [ ] Single [ ]  [ ]  [ ]   Sexual History:   Occupation:  Recent Travel:  Country of Birth:  Advance Directives:    Allergies    No Known Drug Allergies  pollen (Rhinorrhea)    Intolerances        HOME MEDICATIONS:    REVIEW OF SYSTEMS:  [ ] Unable to assess ROS because patient is intubated    OBJECTIVE:  ICU Vital Signs Last 24 Hrs  T(C): 36.8 (03 Apr 2020 01:04), Max: 36.8 (03 Apr 2020 01:04)  T(F): 98.3 (03 Apr 2020 01:04), Max: 98.3 (03 Apr 2020 01:04)  HR: 79 (03 Apr 2020 01:04) (79 - 94)  BP: 124/72 (03 Apr 2020 01:04) (110/57 - 150/79)  BP(mean): --  ABP: --  ABP(mean): --  RR: 30 (03 Apr 2020 01:04) (20 - 30)  SpO2: 88% (03 Apr 2020 01:04) (88% - 93%)        04-01 @ 07:01  -  04-02 @ 07:00  --------------------------------------------------------  IN: 350 mL / OUT: 2950 mL / NET: -2600 mL    04-02 @ 07:01  -  04-03 @ 04:55  --------------------------------------------------------  IN: 970 mL / OUT: 550 mL / NET: 420 mL      CAPILLARY BLOOD GLUCOSE      POCT Blood Glucose.: 195 mg/dL (03 Apr 2020 01:21)      PHYSICAL EXAM:  GENERAL: moderate respiratory distress  HEAD:  Atraumatic, Normocephalic  EYES: EOMI, PERRLA, conjunctiva and sclera clear  ENT: Moist mucous membranes  NECK: Supple, No JVD  CHEST/LUNG: Clear to auscultation bilaterally; use of accessory muscle noted  HEART: Regular rate and rhythm; No murmurs, rubs, or gallops  ABDOMEN: Bowel sounds present; Soft, Nontender, Nondistended. No hepatomegaly  EXTREMITIES:  2+ Peripheral Pulses, brisk capillary refill. No clubbing, cyanosis, or edema  NERVOUS SYSTEM:  Alert & Oriented X0  MSK: unable to assess  SKIN: No rashes or lesions    LINES:     HOSPITAL MEDICATIONS:  MEDICATIONS  (STANDING):  anakinra Injectable 100 milliGRAM(s) SubCutaneous <User Schedule>  enoxaparin Injectable 40 milliGRAM(s) SubCutaneous daily  methylPREDNISolone sodium succinate Injectable 40 milliGRAM(s) IV Push every 12 hours  potassium chloride  10 mEq/100 mL IVPB 10 milliEquivalent(s) IV Intermittent every 1 hour    MEDICATIONS  (PRN):  acetaminophen   Tablet .. 650 milliGRAM(s) Oral every 6 hours PRN Temp greater or equal to 38C (100.4F)  ALBUTerol    90 MICROgram(s) HFA Inhaler 2 Puff(s) Inhalation every 6 hours PRN Shortness of Breath and/or Wheezing  benzonatate 100 milliGRAM(s) Oral three times a day PRN Cough  guaiFENesin   Syrup  (Sugar-Free) 100 milliGRAM(s) Oral every 6 hours PRN Cough  sodium chloride 0.65% Nasal 1 Spray(s) Both Nostrils every 8 hours PRN Nasal Congestion      LABS:                        13.9   7.02  )-----------( 249      ( 01 Apr 2020 08:01 )             41.6     Hgb Trend: 13.9<--, 13.5<--, 13.3<--  04-01    131<L>  |  89<L>  |  9   ----------------------------<  114<H>  3.8   |  25  |  0.78    Ca    9.2      01 Apr 2020 07:54    TPro  7.8  /  Alb  4.0  /  TBili  0.8  /  DBili  x   /  AST  186<H>  /  ALT  229<H>  /  AlkPhos  192<H>  04-01    Creatinine Trend: 0.78<--, 0.82<--, 0.79<--      Arterial Blood Gas:  04-03 @ 03:24  7.42/39/62/25/91/1.4  ABG lactate: --        MICROBIOLOGY:     RADIOLOGY:  1) CTA chest (4/2)- IMPRESSION:    No pulmonary embolus to the level of the lobar arteries. Smaller vessels cannot be evaluated due to respiratory motion.   Diffuse ground-glass opacities in both lungs with increased consolidations in the periphery, in keeping with known COVID-19 infection.     EKG: Sinus tach to 104 bpm. QTc- 418 ms    Assessment/Plan:  49 y.o. M w/ no significant PMHx p/w fevers, cough, myalgias, headache found to have acute hypoxic respiratory failure 2/2 COVID19, s/p intubation on 4/3.    #Neuro  - AxO x 3 at baseline, concern for delirium 2/2 unclear cause. DDx- microthrombi 2/2 COVID19 vs hyperammonemia vs hypercapnia, vs seizure vs stroke vs hyponatremia  - intubated and sedated on propofol  - F/u ammonia level  - F/u CTH given acute onset mental status changes. If negative, can start full dose AC    #Resp  - Acute Hypoxic Respiratory failure 2/2 COVID19 (possible terminal bronchial obstruction)- intubated on 4/3  ABG showed pH- 7.42, pCO2- 39 and pO2- 62  - CTA- No pulmonary embolus to the level of the lobar arteries. Diffuse ground-glass opacities in both lungs with increased consolidations in the periphery, in keeping with known COVID-19 infection.   - current vent settings- TV-400 , RR- 20, FiO2- 70 and PEEP- 14  - F/u repeat ABG  - will prone 18h and supine 6h    #CV  - no active issues    #GI  - will keep NPO for now  - on senna and miralax  - s/p reglan 10 mg x 2 for NBNB emesis    #Transaminitis  - Likely 2/2 sepsis 2/2 COVID19  - will continue to monitor  - if uptrending, will consider obtaining US RUQ Abd  - AXR to r/o ileus given NBNB emesis    #ID  - Urine and Blood cx (3/29)- NGTD, Rapid Flu- negative and Legionella- negative  - COVID19 PCR positive- 3/29  - F/u COVID-19 workups:     -- On Admission: CBC w/ diff, CMP/mg/phos, Coags, D-dimer, procalcitonin, ESR/CRP, LDH, ferritin, lactate, T cell subset, CK, Trop, G6PD, natural killer cells and IL6/8; Lung scores and cardiac ultrasound    -- Daily: CBC w/ diff, CMP/mg/phos, fibrinogen, and CRP    -- Every 3 days: T cell subset, D-dimer, LDH, Ferritin, CK, trop and coags  - s/p Plaquenil, x 4 days, CTX/AZT (3/29). Per ID recs, does not meet criteria for IL-6 2/2 LFTs  - Patient is currently on Anakinra (s/p 2 doses). Will continue for total 3 days  - c/w Solu-Medrol 40 mg BID (D2/5)    #Renal  - no active issues, monitor I/O  - Cr- 0.78  - IV Lasix 40 mg for goal negative 2L  - monitor bmp    #Heme  - H/H stable  - no active issues    #Endo  - no active issues  - will start LDSSI and monitor FS Q6H    #DVT PPx  - heparin gtt with bolus given hypercoagulable state with COVID19 (given elevated ferritin level). If CTH negative for hemorrhage, can start heparin gtt full AC.   - F/u D-dimer    #GOC/Ethics- FULL CODE CHIEF COMPLAINT: fever, cough and SOB    HPI: Patient is a 49M w/ no significant PMHx p/w fevers, cough, myalgias, and headache, x 6 days. He endorses sick contacts at home w/ family members, nobody has been tested for Covid.  Pt called PMD on Monday (3/23) who stated to watch sxs and go to ED if he becomes SOB. Pt started developing SOB on 3/28 and had Tmax of 103F.  Pt has been taking Tylenol around the clock w/ good relief of sxs. Pt has also had persistent diarrhea for three days. Pt denies any respiratory or smoking hx. He was getting plaquenil, AZT/CTX, solu-medrol and Anakirna for COVID19. RRT was called on 4/3 for hypoxia to 70s despite being on NRB 15L and NC 10L. He was placed in prone position, but he kept pulling his NRB and had two episodes of emesis. He was subsequently supined and placed on NRB15L with NC 10L. However, he continued to pull out his NRB and desatted to mid-80s. In addition, his mental status had significantly changed and he was confused. At baseline, he is AxOx 3. He was subsequently intubated and transferred to the MICU for further management.    PAST MEDICAL & SURGICAL HISTORY:  History of umbilical hernia- 1/2020  H/O right knee surgery: 2004 - ACL, meniscus tear  History of left knee surgery: 1992 - ACL, meniscus tear    Patient Currently Takes Medications as of 08-Jan-2020 09:32 documented in Structured Notes  · oxyCODONE 5 mg oral tablet: 1 tab(s) orally every 6 hours, As Needed MDD:4   · Motrin 300 mg oral tablet: 200 milligram(s) orally , As Needed      FAMILY HISTORY:      SOCIAL HISTORY:  Smoking: [ ] Never Smoked [ ] Former Smoker (__ packs x ___ years) [ ] Current Smoker  (__ packs x ___ years)  Substance Use: [ ] Never Used [ ] Used ____  EtOH Use:  Marital Status: [ ] Single [ ]  [ ]  [ ]   Sexual History:   Occupation:  Recent Travel:  Country of Birth:  Advance Directives:    Allergies    No Known Drug Allergies  pollen (Rhinorrhea)    Intolerances        HOME MEDICATIONS:    REVIEW OF SYSTEMS:  [ ] Unable to assess ROS because patient is intubated    OBJECTIVE:  ICU Vital Signs Last 24 Hrs  T(C): 36.8 (03 Apr 2020 01:04), Max: 36.8 (03 Apr 2020 01:04)  T(F): 98.3 (03 Apr 2020 01:04), Max: 98.3 (03 Apr 2020 01:04)  HR: 79 (03 Apr 2020 01:04) (79 - 94)  BP: 124/72 (03 Apr 2020 01:04) (110/57 - 150/79)  BP(mean): --  ABP: --  ABP(mean): --  RR: 30 (03 Apr 2020 01:04) (20 - 30)  SpO2: 88% (03 Apr 2020 01:04) (88% - 93%)        04-01 @ 07:01  -  04-02 @ 07:00  --------------------------------------------------------  IN: 350 mL / OUT: 2950 mL / NET: -2600 mL    04-02 @ 07:01  -  04-03 @ 04:55  --------------------------------------------------------  IN: 970 mL / OUT: 550 mL / NET: 420 mL      CAPILLARY BLOOD GLUCOSE      POCT Blood Glucose.: 195 mg/dL (03 Apr 2020 01:21)      PHYSICAL EXAM:  GENERAL: moderate respiratory distress  HEAD:  Atraumatic, Normocephalic  EYES: EOMI, PERRLA, conjunctiva and sclera clear  ENT: Moist mucous membranes  NECK: Supple, No JVD  CHEST/LUNG: Clear to auscultation bilaterally; use of accessory muscle noted  HEART: Regular rate and rhythm; No murmurs, rubs, or gallops  ABDOMEN: Bowel sounds present; Soft, Nontender, Nondistended. No hepatomegaly  EXTREMITIES:  2+ Peripheral Pulses, brisk capillary refill. No clubbing, cyanosis, or edema  NERVOUS SYSTEM:  Alert & Oriented X0  MSK: unable to assess  SKIN: No rashes or lesions    LINES:     HOSPITAL MEDICATIONS:  MEDICATIONS  (STANDING):  anakinra Injectable 100 milliGRAM(s) SubCutaneous <User Schedule>  enoxaparin Injectable 40 milliGRAM(s) SubCutaneous daily  methylPREDNISolone sodium succinate Injectable 40 milliGRAM(s) IV Push every 12 hours  potassium chloride  10 mEq/100 mL IVPB 10 milliEquivalent(s) IV Intermittent every 1 hour    MEDICATIONS  (PRN):  acetaminophen   Tablet .. 650 milliGRAM(s) Oral every 6 hours PRN Temp greater or equal to 38C (100.4F)  ALBUTerol    90 MICROgram(s) HFA Inhaler 2 Puff(s) Inhalation every 6 hours PRN Shortness of Breath and/or Wheezing  benzonatate 100 milliGRAM(s) Oral three times a day PRN Cough  guaiFENesin   Syrup  (Sugar-Free) 100 milliGRAM(s) Oral every 6 hours PRN Cough  sodium chloride 0.65% Nasal 1 Spray(s) Both Nostrils every 8 hours PRN Nasal Congestion      LABS:                        13.9   7.02  )-----------( 249      ( 01 Apr 2020 08:01 )             41.6     Hgb Trend: 13.9<--, 13.5<--, 13.3<--  04-01    131<L>  |  89<L>  |  9   ----------------------------<  114<H>  3.8   |  25  |  0.78    Ca    9.2      01 Apr 2020 07:54    TPro  7.8  /  Alb  4.0  /  TBili  0.8  /  DBili  x   /  AST  186<H>  /  ALT  229<H>  /  AlkPhos  192<H>  04-01    Creatinine Trend: 0.78<--, 0.82<--, 0.79<--      Arterial Blood Gas:  04-03 @ 03:24  7.42/39/62/25/91/1.4  ABG lactate: --        MICROBIOLOGY:     RADIOLOGY:  1) CTA chest (4/2)- IMPRESSION:    No pulmonary embolus to the level of the lobar arteries. Smaller vessels cannot be evaluated due to respiratory motion.   Diffuse ground-glass opacities in both lungs with increased consolidations in the periphery, in keeping with known COVID-19 infection.     EKG: Sinus tach to 104 bpm. QTc- 418 ms    Assessment/Plan:  49 y.o. M w/ no significant PMHx p/w fevers, cough, myalgias, headache found to have acute hypoxic respiratory failure 2/2 COVID19, s/p intubation on 4/3.    #Neuro  - AxO x 3 at baseline, concern for delirium 2/2 unclear cause. DDx- microthrombi 2/2 COVID19 vs hyperammonemia vs hypercapnia, vs seizure vs stroke vs hyponatremia  - intubated and sedated on propofol  - F/u ammonia level  - F/u CTH given acute onset mental status changes. If negative, can start full dose AC    #Resp  - Acute Hypoxic Respiratory failure 2/2 COVID19 (possible terminal bronchial obstruction)- intubated on 4/3  ABG showed pH- 7.42, pCO2- 39 and pO2- 62  - CTA- No pulmonary embolus to the level of the lobar arteries. Diffuse ground-glass opacities in both lungs with increased consolidations in the periphery, in keeping with known COVID-19 infection.   - current vent settings- TV-400 , RR- 20, FiO2- 70 and PEEP- 14  - F/u repeat ABG  - will prone 18h and supine 6h    #CV  - no active issues    #GI  - will keep NPO for now  - on senna and miralax  - s/p reglan 10 mg x 2 for NBNB emesis    #Transaminitis  - Likely 2/2 sepsis 2/2 COVID19  - will continue to monitor  - if uptrending, will consider obtaining US RUQ Abd  - AXR to r/o ileus given NBNB emesis    #ID  - Urine and Blood cx (3/29)- NGTD, Rapid Flu- negative and Legionella- negative  - COVID19 PCR positive- 3/29  - F/u COVID-19 workups:     -- On Admission: CBC w/ diff, CMP/mg/phos, Coags, D-dimer, procalcitonin, ESR/CRP, LDH, ferritin, lactate, T cell subset, CK, Trop, G6PD, natural killer cells and IL6/8; Lung scores and cardiac ultrasound    -- Daily: CBC w/ diff, CMP/mg/phos, fibrinogen, and CRP    -- Every 3 days: T cell subset, D-dimer, LDH, Ferritin, CK, trop and coags  - s/p Plaquenil, x 4 days, CTX/AZT (3/29). Per ID recs, does not meet criteria for IL-6 2/2 LFTs  - Patient is currently on Anakinra (s/p 2 doses). Will continue for total 3 days  - c/w Solu-Medrol 40 mg BID (D2/5)    #Renal  - no active issues, monitor I/O  - Cr- 0.78  - IV Lasix 40 mg for goal negative 2L  - monitor bmp    #Heme  - H/H stable  - no active issues    #Endo  - no active issues  - will start LDSSI and monitor FS Q6H    #DVT PPx  - heparin gtt with bolus given hypercoagulable state with COVID19 (given elevated ferritin level). If CTH negative for hemorrhage, can start heparin gtt full AC.   - F/u D-dimer    #GOC/Ethics- FULL CODE CHIEF COMPLAINT: fever, cough and SOB    HPI: Patient is a 49M w/ no significant PMHx p/w fevers, cough, myalgias, and headache, x 6 days. He endorses sick contacts at home w/ family members, nobody has been tested for Covid.  Pt called PMD on Monday (3/23) who stated to watch sxs and go to ED if he becomes SOB. Pt started developing SOB on 3/28 and had Tmax of 103F.  Pt has been taking Tylenol around the clock w/ good relief of sxs. Pt has also had persistent diarrhea for three days. Pt denies any respiratory or smoking hx. He was getting plaquenil, AZT/CTX, solu-medrol and Anakirna for COVID19. RRT was called on 4/3 for hypoxia to 70s despite being on NRB 15L and NC 10L. He was placed in prone position, but he kept pulling his NRB and had two episodes of emesis. He was subsequently supined and placed on NRB15L with NC 10L. However, he continued to pull out his NRB and desatted to mid-80s. In addition, his mental status had significantly changed and he was confused. At baseline, he is AxOx 3. He was subsequently intubated and transferred to the MICU for further management.    PAST MEDICAL & SURGICAL HISTORY:  History of umbilical hernia- 1/2020  H/O right knee surgery: 2004 - ACL, meniscus tear  History of left knee surgery: 1992 - ACL, meniscus tear    Patient Currently Takes Medications as of 08-Jan-2020 09:32 documented in Structured Notes  · oxyCODONE 5 mg oral tablet: 1 tab(s) orally every 6 hours, As Needed MDD:4   · Motrin 300 mg oral tablet: 200 milligram(s) orally , As Needed      FAMILY HISTORY:      SOCIAL HISTORY:  Smoking: [ ] Never Smoked [ ] Former Smoker (__ packs x ___ years) [ ] Current Smoker  (__ packs x ___ years)  Substance Use: [ ] Never Used [ ] Used ____  EtOH Use:  Marital Status: [ ] Single [ ]  [ ]  [ ]   Sexual History:   Occupation:  Recent Travel:  Country of Birth:  Advance Directives:    Allergies    No Known Drug Allergies  pollen (Rhinorrhea)    Intolerances        HOME MEDICATIONS:    REVIEW OF SYSTEMS:  [ ] Unable to assess ROS because patient is intubated    OBJECTIVE:  ICU Vital Signs Last 24 Hrs  T(C): 36.8 (03 Apr 2020 01:04), Max: 36.8 (03 Apr 2020 01:04)  T(F): 98.3 (03 Apr 2020 01:04), Max: 98.3 (03 Apr 2020 01:04)  HR: 79 (03 Apr 2020 01:04) (79 - 94)  BP: 124/72 (03 Apr 2020 01:04) (110/57 - 150/79)  BP(mean): --  ABP: --  ABP(mean): --  RR: 30 (03 Apr 2020 01:04) (20 - 30)  SpO2: 88% (03 Apr 2020 01:04) (88% - 93%)        04-01 @ 07:01  -  04-02 @ 07:00  --------------------------------------------------------  IN: 350 mL / OUT: 2950 mL / NET: -2600 mL    04-02 @ 07:01  -  04-03 @ 04:55  --------------------------------------------------------  IN: 970 mL / OUT: 550 mL / NET: 420 mL      CAPILLARY BLOOD GLUCOSE      POCT Blood Glucose.: 195 mg/dL (03 Apr 2020 01:21)      PHYSICAL EXAM:  GENERAL: moderate respiratory distress  HEAD:  Atraumatic, Normocephalic  EYES: EOMI, PERRLA, conjunctiva and sclera clear  ENT: Moist mucous membranes  NECK: Supple, No JVD  CHEST/LUNG: Clear to auscultation bilaterally; use of accessory muscle noted  HEART: Regular rate and rhythm; No murmurs, rubs, or gallops  ABDOMEN: Bowel sounds present; Soft, Nontender, Nondistended. No hepatomegaly  EXTREMITIES:  2+ Peripheral Pulses, brisk capillary refill. No clubbing, cyanosis, or edema  NERVOUS SYSTEM:  Alert & Oriented X0  MSK: unable to assess  SKIN: No rashes or lesions    LINES:     HOSPITAL MEDICATIONS:  MEDICATIONS  (STANDING):  anakinra Injectable 100 milliGRAM(s) SubCutaneous <User Schedule>  enoxaparin Injectable 40 milliGRAM(s) SubCutaneous daily  methylPREDNISolone sodium succinate Injectable 40 milliGRAM(s) IV Push every 12 hours  potassium chloride  10 mEq/100 mL IVPB 10 milliEquivalent(s) IV Intermittent every 1 hour    MEDICATIONS  (PRN):  acetaminophen   Tablet .. 650 milliGRAM(s) Oral every 6 hours PRN Temp greater or equal to 38C (100.4F)  ALBUTerol    90 MICROgram(s) HFA Inhaler 2 Puff(s) Inhalation every 6 hours PRN Shortness of Breath and/or Wheezing  benzonatate 100 milliGRAM(s) Oral three times a day PRN Cough  guaiFENesin   Syrup  (Sugar-Free) 100 milliGRAM(s) Oral every 6 hours PRN Cough  sodium chloride 0.65% Nasal 1 Spray(s) Both Nostrils every 8 hours PRN Nasal Congestion      LABS:                        13.9   7.02  )-----------( 249      ( 01 Apr 2020 08:01 )             41.6     Hgb Trend: 13.9<--, 13.5<--, 13.3<--  04-01    131<L>  |  89<L>  |  9   ----------------------------<  114<H>  3.8   |  25  |  0.78    Ca    9.2      01 Apr 2020 07:54    TPro  7.8  /  Alb  4.0  /  TBili  0.8  /  DBili  x   /  AST  186<H>  /  ALT  229<H>  /  AlkPhos  192<H>  04-01    Creatinine Trend: 0.78<--, 0.82<--, 0.79<--      Arterial Blood Gas:  04-03 @ 03:24  7.42/39/62/25/91/1.4  ABG lactate: --        MICROBIOLOGY:     RADIOLOGY:  1) CTA chest (4/2)- IMPRESSION:    No pulmonary embolus to the level of the lobar arteries. Smaller vessels cannot be evaluated due to respiratory motion.   Diffuse ground-glass opacities in both lungs with increased consolidations in the periphery, in keeping with known COVID-19 infection.     EKG: Sinus tach to 104 bpm. QTc- 418 ms    Assessment/Plan:  49 y.o. M w/ no significant PMHx p/w fevers, cough, myalgias, headache found to have acute hypoxic respiratory failure 2/2 COVID19, s/p intubation on 4/3.    #Neuro  - AxO x 3 at baseline, concern for delirium 2/2 unclear cause. DDx- microthrombi 2/2 COVID19 vs hyperammonemia vs hypercapnia, vs seizure vs stroke vs hyponatremia  - intubated and sedated on propofol  - F/u ammonia level  - F/u CTH given acute onset mental status changes. If negative, can start full dose AC    #Resp  - Acute Hypoxic Respiratory failure 2/2 COVID19 (possible terminal bronchial obstruction)- intubated on 4/3  ABG showed pH- 7.42, pCO2- 39 and pO2- 62  - CTA- No pulmonary embolus to the level of the lobar arteries. Diffuse ground-glass opacities in both lungs with increased consolidations in the periphery, in keeping with known COVID-19 infection.   - current vent settings- TV-400 , RR- 20, FiO2- 70 and PEEP- 14  - F/u repeat ABG  - will prone 18h and supine 6h    #CV  - no active issues    #GI  - will keep NPO for now  - on senna and miralax  - s/p reglan 10 mg x 2 for NBNB emesis    #Transaminitis  - Likely 2/2 sepsis 2/2 COVID19  - will continue to monitor  - if uptrending, will consider obtaining US RUQ Abd  - AXR to r/o ileus given NBNB emesis    #ID  - Urine and Blood cx (3/29)- NGTD, Rapid Flu- negative and Legionella- negative  - COVID19 PCR positive- 3/29  - F/u COVID-19 workups:     -- On Admission: CBC w/ diff, CMP/mg/phos, Coags, D-dimer, procalcitonin, ESR/CRP, LDH, ferritin, lactate, T cell subset, CK, Trop, G6PD, natural killer cells and IL6/8; Lung scores and cardiac ultrasound    -- Daily: CBC w/ diff, CMP/mg/phos, fibrinogen, and CRP    -- Every 3 days: T cell subset, D-dimer, LDH, Ferritin, CK, trop and coags  - s/p Plaquenil, x 4 days, CTX/AZT (3/29). Per ID recs, does not meet criteria for IL-6 2/2 LFTs  - Patient is currently on Anakinra (s/p 2 doses). Will continue for total 3 days  - c/w Solu-Medrol 40 mg BID (D2/5)    #Renal  - no active issues, monitor I/O  - Cr- 0.78  - IV Lasix 40 mg for goal negative 2L  - monitor bmp    #Heme  - H/H stable  - no active issues    #Endo  - no active issues  - will start LDSSI and monitor FS Q6H    #DVT PPx  - heparin gtt with bolus given hypercoagulable state with COVID19 (given elevated ferritin level). If CTH negative for hemorrhage, can start heparin gtt full AC.   - F/u D-dimer    #GOC/Ethics- FULL CODE  - Emergency Contact- Jonas Tenaentes= [phone] CHIEF COMPLAINT: fever, cough and SOB    HPI: Patient is a 49M w/ no significant PMHx p/w fevers, cough, myalgias, and headache, x 6 days. He endorses sick contacts at home w/ family members, nobody has been tested for Covid.  Pt called PMD on Monday (3/23) who stated to watch sxs and go to ED if he becomes SOB. Pt started developing SOB on 3/28 and had Tmax of 103F.  Pt has been taking Tylenol around the clock w/ good relief of sxs. Pt has also had persistent diarrhea for three days. Pt denies any respiratory or smoking hx. He was getting plaquenil, AZT/CTX, solu-medrol and Anakirna for COVID19. RRT was called on 4/3 for hypoxia to 70s despite being on NRB 15L and NC 10L. He was placed in prone position, but he kept pulling his NRB and had two episodes of emesis. He was subsequently supined and placed on NRB15L with NC 10L. However, he continued to pull out his NRB and desatted to mid-80s. In addition, his mental status had significantly changed and he was confused. At baseline, he is AxOx 3. He was subsequently intubated and transferred to the MICU for further management.    PAST MEDICAL & SURGICAL HISTORY:  History of umbilical hernia- 1/2020  H/O right knee surgery: 2004 - ACL, meniscus tear  History of left knee surgery: 1992 - ACL, meniscus tear    Patient Currently Takes Medications as of 08-Jan-2020 09:32 documented in Structured Notes  · oxyCODONE 5 mg oral tablet: 1 tab(s) orally every 6 hours, As Needed MDD:4   · Motrin 300 mg oral tablet: 200 milligram(s) orally , As Needed      FAMILY HISTORY:      SOCIAL HISTORY:  Smoking: [ ] Never Smoked [ ] Former Smoker (__ packs x ___ years) [ ] Current Smoker  (__ packs x ___ years)  Substance Use: [ ] Never Used [ ] Used ____  EtOH Use:  Marital Status: [ ] Single [ ]  [ ]  [ ]   Sexual History:   Occupation:  Recent Travel:  Country of Birth:  Advance Directives:    Allergies    No Known Drug Allergies  pollen (Rhinorrhea)    Intolerances        HOME MEDICATIONS:    REVIEW OF SYSTEMS:  [ ] Unable to assess ROS because patient is intubated    OBJECTIVE:  ICU Vital Signs Last 24 Hrs  T(C): 36.8 (03 Apr 2020 01:04), Max: 36.8 (03 Apr 2020 01:04)  T(F): 98.3 (03 Apr 2020 01:04), Max: 98.3 (03 Apr 2020 01:04)  HR: 79 (03 Apr 2020 01:04) (79 - 94)  BP: 124/72 (03 Apr 2020 01:04) (110/57 - 150/79)  BP(mean): --  ABP: --  ABP(mean): --  RR: 30 (03 Apr 2020 01:04) (20 - 30)  SpO2: 88% (03 Apr 2020 01:04) (88% - 93%)        04-01 @ 07:01  -  04-02 @ 07:00  --------------------------------------------------------  IN: 350 mL / OUT: 2950 mL / NET: -2600 mL    04-02 @ 07:01  -  04-03 @ 04:55  --------------------------------------------------------  IN: 970 mL / OUT: 550 mL / NET: 420 mL      CAPILLARY BLOOD GLUCOSE      POCT Blood Glucose.: 195 mg/dL (03 Apr 2020 01:21)      PHYSICAL EXAM:  GENERAL: moderate respiratory distress  HEAD:  Atraumatic, Normocephalic  EYES: EOMI, PERRLA, conjunctiva and sclera clear  ENT: Moist mucous membranes  NECK: Supple, No JVD  CHEST/LUNG: Clear to auscultation bilaterally; use of accessory muscle noted  HEART: Regular rate and rhythm; No murmurs, rubs, or gallops  ABDOMEN: Bowel sounds present; Soft, Nontender, Nondistended. No hepatomegaly  EXTREMITIES:  2+ Peripheral Pulses, brisk capillary refill. No clubbing, cyanosis, or edema  NERVOUS SYSTEM:  Alert & Oriented X0  MSK: unable to assess  SKIN: No rashes or lesions    LINES:     HOSPITAL MEDICATIONS:  MEDICATIONS  (STANDING):  anakinra Injectable 100 milliGRAM(s) SubCutaneous <User Schedule>  enoxaparin Injectable 40 milliGRAM(s) SubCutaneous daily  methylPREDNISolone sodium succinate Injectable 40 milliGRAM(s) IV Push every 12 hours  potassium chloride  10 mEq/100 mL IVPB 10 milliEquivalent(s) IV Intermittent every 1 hour    MEDICATIONS  (PRN):  acetaminophen   Tablet .. 650 milliGRAM(s) Oral every 6 hours PRN Temp greater or equal to 38C (100.4F)  ALBUTerol    90 MICROgram(s) HFA Inhaler 2 Puff(s) Inhalation every 6 hours PRN Shortness of Breath and/or Wheezing  benzonatate 100 milliGRAM(s) Oral three times a day PRN Cough  guaiFENesin   Syrup  (Sugar-Free) 100 milliGRAM(s) Oral every 6 hours PRN Cough  sodium chloride 0.65% Nasal 1 Spray(s) Both Nostrils every 8 hours PRN Nasal Congestion      LABS:                        13.9   7.02  )-----------( 249      ( 01 Apr 2020 08:01 )             41.6     Hgb Trend: 13.9<--, 13.5<--, 13.3<--  04-01    131<L>  |  89<L>  |  9   ----------------------------<  114<H>  3.8   |  25  |  0.78    Ca    9.2      01 Apr 2020 07:54    TPro  7.8  /  Alb  4.0  /  TBili  0.8  /  DBili  x   /  AST  186<H>  /  ALT  229<H>  /  AlkPhos  192<H>  04-01    Creatinine Trend: 0.78<--, 0.82<--, 0.79<--      Arterial Blood Gas:  04-03 @ 03:24  7.42/39/62/25/91/1.4  ABG lactate: --        MICROBIOLOGY:     RADIOLOGY:  1) CTA chest (4/2)- IMPRESSION:    No pulmonary embolus to the level of the lobar arteries. Smaller vessels cannot be evaluated due to respiratory motion.   Diffuse ground-glass opacities in both lungs with increased consolidations in the periphery, in keeping with known COVID-19 infection.     EKG: Sinus tach to 104 bpm. QTc- 418 ms    Assessment/Plan:  49 y.o. M w/ no significant PMHx p/w fevers, cough, myalgias, headache found to have acute hypoxic respiratory failure 2/2 COVID19, s/p intubation on 4/3.    #Neuro  - AxO x 3 at baseline, concern for delirium 2/2 unclear cause. DDx- microthrombi 2/2 COVID19 vs hyperammonemia vs hypercapnia, vs seizure vs stroke vs hyponatremia  - intubated and sedated on propofol  - F/u ammonia level  - F/u CTH given acute onset mental status changes. If negative, can start full dose AC    #Resp  - Acute Hypoxic Respiratory failure 2/2 COVID19 (possible terminal bronchial obstruction)- intubated on 4/3  ABG showed pH- 7.42, pCO2- 39 and pO2- 62  - CTA- No pulmonary embolus to the level of the lobar arteries. Diffuse ground-glass opacities in both lungs with increased consolidations in the periphery, in keeping with known COVID-19 infection.   - current vent settings- TV-400 , RR- 20, FiO2- 70 and PEEP- 14  - F/u repeat ABG  - will prone 18h and supine 6h    #CV  - no active issues    #GI  - will keep NPO for now  - on senna and miralax  - s/p reglan 10 mg x 2 for NBNB emesis    #Transaminitis  - Likely 2/2 sepsis 2/2 COVID19  - will continue to monitor  - if uptrending, will consider obtaining US RUQ Abd  - AXR to r/o ileus given NBNB emesis    #ID  - Urine and Blood cx (3/29)- NGTD, Rapid Flu- negative and Legionella- negative  - COVID19 PCR positive- 3/29  - F/u COVID-19 workups:     -- On Admission: CBC w/ diff, CMP/mg/phos, Coags, D-dimer, procalcitonin, ESR/CRP, LDH, ferritin, lactate, T cell subset, CK, Trop, G6PD, natural killer cells and IL6/8; Lung scores and cardiac ultrasound    -- Daily: CBC w/ diff, CMP/mg/phos, fibrinogen, and CRP    -- Every 3 days: T cell subset, D-dimer, LDH, Ferritin, CK, trop and coags  - s/p Plaquenil, x 4 days, CTX/AZT (3/29). Per ID recs, does not meet criteria for IL-6 2/2 LFTs  - Patient is currently on Anakinra (s/p 2 doses). Will continue for total 3 days  - c/w Solu-Medrol 40 mg BID (D2/5)    #Renal  - no active issues, monitor I/O  - Cr- 0.78  - IV Lasix 40 mg for goal negative 2L  - monitor bmp    #Heme  - H/H stable  - no active issues    #Endo  - no active issues  - will start LDSSI and monitor FS Q6H    #DVT PPx  - heparin gtt with bolus given hypercoagulable state with COVID19 (given elevated ferritin level). If CTH negative for hemorrhage, can start heparin gtt full AC.   - F/u D-dimer    #GOC/Ethics- FULL CODE  - Emergency Contact- Jonas Mcleod= [phone]      ATTENDING NOTE;  Resp failure sec to covid pna - A lines anteriorly with focal B lines - significant Aa gradient. Concerning delirium   - > head ct to eval for thrombi  - > prophylactic heparin but if head ct negative would do full dose AC  - > prone ventilation lung protective  - > continue anakinra and already started on steroids which can continue  - > diuresis  - > hold feeds for now (vomiting with hiccups - > check abd xray for ileus)

## 2020-04-03 NOTE — CHART NOTE - NSCHARTNOTEFT_GEN_A_CORE
Hypoxemia    Patient pulling off mask, not tolerating prone position. 2 episodes of nausea. Patient with maximum. Patient with brief periods of confusion and lucidity worsening throughout the night .    Blood Gas Profile - Arterial (04.03.20 @ 03:24)    pH, Arterial: 7.42    pCO2, Arterial: 39 mmHg    pO2, Arterial: 62 mmHg    HCO3, Arterial: 25 mmol/L    Base Excess, Arterial: 1.4 mmol/L    Oxygen Saturation, Arterial: 91 %    Total CO2, Arterial: 26 mmoL/L    FIO2, Arterial: 100    Blood Gas Source Arterial: Arterial    Plan  MICU consult called (accepted)    Will notify attending.   Made multiple attempts to call spouse Jonas, message left on voicemail to call 7T unit.    Shaina Mcdonald NP  spectralink [phone]

## 2020-04-03 NOTE — PROCEDURE NOTE - NSPROCDETAILS_GEN_ALL_CORE
location identified, draped/prepped, sterile technique used, needle inserted/introduced/sutured in place/all materials/supplies accounted for at end of procedure/connected to a pressurized flush line/hemostasis with direct pressure, dressing applied/Seldinger technique/positive blood return obtained via catheter
sterile dressing applied/lumen(s) aspirated and flushed/guidewire recovered/ultrasound guidance/sterile technique, catheter placed

## 2020-04-03 NOTE — PROGRESS NOTE ADULT - ASSESSMENT
pt with COVID pneumonia      - intubated for acute hypoxic resp distress sec to COVID pneumonia cont pressors+ steroids -   further care as per ICU team

## 2020-04-03 NOTE — CHART NOTE - NSCHARTNOTEFT_GEN_A_CORE
Hypoxia    Notified by RN that patient c/o not being able to breath. Spo2 went down to 77 on 15L NRB and 10L. Attempted to prone spo2 went up 86% then patient vomited while lying face down. RRT called for hypoxia.    S/p RRT see notes    Will continue to monitor, Lasix 40mg to be given now as pulse ox dropped to 86% after the RRT.  Low threshold to recall RRT/MICU consult    Will endorse to primary day team, attending to follow    Shaina Mcdonald NP  spectralink [phone] Hypoxia    Notified by RN that patient c/o not being able to breath. Spo2 went down to 77 on 15L NRB and 10L. Attempted to prone spo2 went up 86% then patient vomited while lying face down. RRT called for hypoxia.    S/p RRT see notes    Will continue to monitor, if needed, Lasix 40mg to be given if desaturates. spo2 now 94%  Low threshold to recall RRT/MICU consult    Will endorse to primary day team, attending to follow    Shaina Mcdonald NP  spectralink [phone]

## 2020-04-03 NOTE — CHART NOTE - NSCHARTNOTEFT_GEN_A_CORE
Nutrition Follow Up Note    Patient seen for: nutrition follow up on COVID ICU    Source: chart, team    Chart reviewed, events noted. Intubated this am. OGT.    Diet : 4/3 NPO    Enteral/Parenteral: propofol ordered this am    GI: no vomiting, no abd distention, last BM 4/3    Wt: adm wt 3/28 81.6 kg    Edema: none    Skin: no pressure injuries documented     Pertinent labs:   (on solumedrol), elevated LFTs    Estimated needs: based on adm wt 81.6 kg  4534-1450 kcals (20 kcals/kg),  gm protein (1.3 gm/kg)    Nutrition dx: none    Recommendations: Enteral feeds Vital AF (1.2) goal 60cc/hr x 24 hrs to provide 1728 kcals, 108 gm protein, 1168cc free water meets 21 kcals/kg, 1.3 gm protein/kg adm wt 81.6 kg    Trend wt, labs, NPO status

## 2020-04-03 NOTE — CONSULT NOTE ADULT - ASSESSMENT
[facility] Follow-up Visit    [place] Hx:  - No smoking  + Social etoh  - Denies illicit drug use   - Occupation:   - Lives with spouse  - NSAIDs/ASA use: ASA 81 mg daily      History, Medications, Allergies, ROS:      Past Medical History:   Diagnosis Date   • Arrhythmia     uses Meto ITCHING  Compazine [Prochlor*        Comment:LOCK JAW  Pollen Extract          Runny nose    ROS:   CONSTITUTIONAL:  negative for fevers, chills  EYES:  negative for change in vision  RESPIRATORY:  negative for  shortness of breath  CARDIOVASCULAR:  negati unless he forgets to take his medication. No recurrent dysphagia or odynophagia. PE unremarkable. Patient is well managed on PPI therapy without breakthrough or red flag symptoms.   Reviewed appropriate PPI use with patient including proven and controve Pt is a 48 yo M with no PMH admitted with Covid-19 positive, now intubated/sedated and mechanically ventilated after worsening of hypoxia and AMS. Neurology consulted after portable CT head showed low attenuation in the right cerebellum as well as the medial occipital and parietal regions bilaterally thought to be artifact vs acute infarct. Prior to intubation pt had not been noted to have or complained of any focal neurological deficits.    Recommendations:  - MRI head w/o would be preferred method of imaging to clarify this lesion (especially since it looks very much like artifact), however pt not stable enough at this point. Therefor can do full CT head w/o vs less optimally repeat portable CT head in a better position Pt is a 48 yo M with no PMH admitted with Covid-19 positive, now intubated/sedated and mechanically ventilated after worsening of hypoxia and AMS. Neurology consulted after portable CT head showed low attenuation in the right cerebellum as well as the medial occipital and parietal regions bilaterally thought to be artifact vs acute infarct. Prior to intubation pt had not been noted to have or complained of any focal neurological deficits.    Recommendations:  - MRI head w/o would be preferred method of imaging to clarify this lesion (especially since it looks very much like artifact), however pt not stable enough at this point. Therefor can do full CT head w/o when able vs less optimally repeat portable CT head in a better position when able

## 2020-04-03 NOTE — PROGRESS NOTE ADULT - ASSESSMENT
49 y.o. M w/ no significant PMHx p/w fevers, cough, myalgias, headache found to have acute hypoxic respiratory failure 2/2 COVID19, s/p intubation on 4/3.    4/3 plan hightlights  plan to leave proned until 5 am 4/4/20  wean sedation as tolerated  neuro consult for CT findings - repeat CT tomorrow when supine  hold off a/c until 4/4    #Neuro  - AxO x 3 at baseline, concern for delirium 2/2 unclear cause. DDx- microthrombi 2/2 COVID19 vs hyperammonemia vs hypercapnia, vs seizure vs stroke vs hyponatremia  - intubated and sedated on propofol  - CTH given acute onset mental status changes - with some ?PCA territory finding - neurolgoy consult    #Resp  - Acute Hypoxic Respiratory failure 2/2 COVID19 (possible terminal bronchial obstruction)- intubated on 4/3  ABG showed pH- 7.42, pCO2- 39 and pO2- 62  - CTA- No pulmonary embolus to the level of the lobar arteries. Diffuse ground-glass opacities in both lungs with increased consolidations in the periphery, in keeping with known COVID-19 infection.   - titrate vent to ARDSnet   - F/u repeat ABG  - will prone 18h and supine 6h    #CV  - no active issues    #GI  - will keep NPO for now  - on senna and Miralax  - s/p Reglan 10 mg x 2 for NBNB emesis    #Transaminitis  - Likely 2/2 sepsis 2/2 COVID19  - will continue to monitor  - if uptrending, will consider obtaining US RUQ Abd  - AXR to r/o ileus given NBNB emesis    #ID  - Urine and Blood cx (3/29)- NGTD, Rapid Flu- negative and Legionella- negative  - COVID19 PCR positive- 3/29  - F/u COVID-19 workups:     -- On Admission: CBC w/ diff, CMP/mg/phos, Coags, D-dimer, procalcitonin, ESR/CRP, LDH, ferritin, lactate, T cell subset, CK, Trop, G6PD, natural killer cells and IL6/8; Lung scores and cardiac ultrasound    -- Daily: CBC w/ diff, CMP/mg/phos, fibrinogen, and CRP    -- Every 3 days: T cell subset, D-dimer, LDH, Ferritin, CK, trop and coags  - s/p Plaquenil, x 4 days, CTX/AZT (3/29). Per ID recs, does not meet criteria for IL-6 2/2 LFTs  - Patient is currently on Anakinra (s/p 2 doses). Will continue for total 3 days  - c/w Solu-Medrol 40 mg BID (D2/5)    #Renal  - no active issues, monitor I/O  - Cr- 0.78  - IV Lasix 40 mg for goal negative 2L  - monitor bmp    #Heme  - H/H stable  - no active issues    #Endo  - no active issues  - will start LDSSI and monitor FS Q6H    #DVT PPx  - d dimer elevated - will trend  - off a/c for CT head findings 4/3 - will restart if repeat ct head stable without homrrhage    #GOC/Ethics- FULL CODE  - Emergency Contact- Jonas Mcleod= [phone]

## 2020-04-03 NOTE — PROGRESS NOTE ADULT - SUBJECTIVE AND OBJECTIVE BOX
Paul Wells MD  Internal Medicine Resident  [phone]    PATIENT:  KADY VILLAR  [id number]    CHIEF COMPLAINT:  Patient is a 49y old  Male who presents with a chief complaint of fever , cough , shortness of breath (03 Apr 2020 15:20)    INTERVAL HISTORY/OVERNIGHT EVENTS:  overnight  patient arrived in micu  proned this am  good lung compliance    MEDICATIONS:  MEDICATIONS  (STANDING):  anakinra Injectable 100 milliGRAM(s) SubCutaneous <User Schedule>  chlorhexidine 0.12% Liquid 15 milliLiter(s) Oral Mucosa every 12 hours  chlorhexidine 4% Liquid 1 Application(s) Topical <User Schedule>  cisatracurium Infusion 3 MICROgram(s)/kG/Min (14.7 mL/Hr) IV Continuous <Continuous>  dextrose 5%. 1000 milliLiter(s) (50 mL/Hr) IV Continuous <Continuous>  dextrose 50% Injectable 12.5 Gram(s) IV Push once  dextrose 50% Injectable 25 Gram(s) IV Push once  dextrose 50% Injectable 25 Gram(s) IV Push once  enoxaparin Injectable 40 milliGRAM(s) SubCutaneous every 12 hours  fentaNYL   Infusion. 0.5 MICROgram(s)/kG/Hr (2.04 mL/Hr) IV Continuous <Continuous>  methylPREDNISolone sodium succinate Injectable 40 milliGRAM(s) IV Push every 12 hours  midazolam Infusion 0.02 mG/kG/Hr (1.63 mL/Hr) IV Continuous <Continuous>  norepinephrine Infusion 0.05 MICROgram(s)/kG/Min (7.65 mL/Hr) IV Continuous <Continuous>  polyethylene glycol 3350 17 Gram(s) Oral daily  propofol Infusion 10 MICROgram(s)/kG/Min (4.9 mL/Hr) IV Continuous <Continuous>  rocuronium Infusion 4 MICROgram(s)/kG/Min (3.92 mL/Hr) IV Continuous <Continuous>  senna 2 Tablet(s) Oral at bedtime    MEDICATIONS  (PRN):  acetaminophen   Tablet .. 650 milliGRAM(s) Oral every 6 hours PRN Temp greater or equal to 38C (100.4F)  dextrose 40% Gel 15 Gram(s) Oral once PRN Blood Glucose LESS THAN 70 milliGRAM(s)/deciliter  glucagon  Injectable 1 milliGRAM(s) IntraMuscular once PRN Glucose LESS THAN 70 milligrams/deciliter  sodium chloride 0.65% Nasal 1 Spray(s) Both Nostrils every 8 hours PRN Nasal Congestion    ALLERGIES:  Allergies  No Known Drug Allergies  pollen (Rhinorrhea)    OBJECTIVE:  ICU Vital Signs Last 24 Hrs  T(C): 35.3 (03 Apr 2020 09:15), Max: 36.8 (03 Apr 2020 01:04)  T(F): 95.5 (03 Apr 2020 09:15), Max: 98.3 (03 Apr 2020 01:04)  HR: 98 (03 Apr 2020 16:00) (68 - 102)  BP: 106/61 (03 Apr 2020 07:15) (106/61 - 169/95)  BP(mean): 78 (03 Apr 2020 07:15) (78 - 125)  ABP: 118/63 (03 Apr 2020 16:00) (97/54 - 152/79)  ABP(mean): 80 (03 Apr 2020 16:00) (70 - 108)  RR: 24 (03 Apr 2020 16:00) (15 - 31)  SpO2: 95% (03 Apr 2020 16:00) (86% - 98%)    Mode: AC/ CMV (Assist Control/ Continuous Mandatory Ventilation)  RR (machine): 24  TV (machine): 460  FiO2: 60  PEEP: 12  ITime: 1  MAP: 17  PIP: 32    POCT Blood Glucose.: 195 mg/dL (03 Apr 2020 01:21)  POCT Blood Glucose.: 195 mg/dL (03 Apr 2020 01:21)    I&O's Summary  02 Apr 2020 07:01  -  03 Apr 2020 07:00  --------------------------------------------------------  IN: 1018.6 mL / OUT: 1100 mL / NET: -81.4 mL    03 Apr 2020 07:01  -  03 Apr 2020 17:04  --------------------------------------------------------  IN: 513.5 mL / OUT: 310 mL / NET: 203.5 mL    LABS:  ABG - ( 03 Apr 2020 15:21 )  pH, Arterial: 7.28  pH, Blood: x     /  pCO2: 68    /  pO2: 118   / HCO3: 31    / Base Excess: 2.9   /  SaO2: 98                            14.1   16.90 )-----------( 366      ( 03 Apr 2020 06:22 )             42.7     04-03    138  |  95<L>  |  16  ----------------------------<  186<H>  3.4<L>   |  24  |  0.62    Ca    8.4      03 Apr 2020 06:22  Phos  4.3     04-03  Mg     2.6     04-03    TPro  7.9  /  Alb  3.6  /  TBili  0.7  /  DBili  x   /  AST  165<H>  /  ALT  270<H>  /  AlkPhos  298<H>  04-03    LIVER FUNCTIONS - ( 03 Apr 2020 06:22 )  Alb: 3.6 g/dL / Pro: 7.9 g/dL / ALK PHOS: 298 U/L / ALT: 270 U/L / AST: 165 U/L / GGT: x           PT/INR - ( 03 Apr 2020 06:23 )   PT: 12.5 sec;   INR: 1.08 ratio    PTT - ( 03 Apr 2020 06:23 )  PTT:22.6 sec    CKMB Units: 8.0 ng/mL (04-03 @ 06:22)  Creatine Kinase, Serum: 513 U/L (04-03 @ 06:22)    CARDIAC MARKERS ( 03 Apr 2020 06:22 )  x     / x     / 513 U/L / x     / 8.0 ng/mL

## 2020-04-03 NOTE — PROGRESS NOTE ADULT - SUBJECTIVE AND OBJECTIVE BOX
SUBJECTIVE / OVERNIGHT EVENTS: pt intubated & sedated     MEDICATIONS  (STANDING):  anakinra Injectable 100 milliGRAM(s) SubCutaneous <User Schedule>  chlorhexidine 0.12% Liquid 15 milliLiter(s) Oral Mucosa every 12 hours  chlorhexidine 4% Liquid 1 Application(s) Topical <User Schedule>  dextrose 5%. 1000 milliLiter(s) (50 mL/Hr) IV Continuous <Continuous>  dextrose 50% Injectable 12.5 Gram(s) IV Push once  dextrose 50% Injectable 25 Gram(s) IV Push once  dextrose 50% Injectable 25 Gram(s) IV Push once  fentaNYL   Infusion. 0.5 MICROgram(s)/kG/Hr (2.04 mL/Hr) IV Continuous <Continuous>  methylPREDNISolone sodium succinate Injectable 40 milliGRAM(s) IV Push every 12 hours  midazolam Infusion 0.02 mG/kG/Hr (1.63 mL/Hr) IV Continuous <Continuous>  norepinephrine Infusion 0.05 MICROgram(s)/kG/Min (7.65 mL/Hr) IV Continuous <Continuous>  polyethylene glycol 3350 17 Gram(s) Oral daily  propofol Infusion 10.008 MICROgram(s)/kG/Min (4.9 mL/Hr) IV Continuous <Continuous>  rocuronium Infusion 4 MICROgram(s)/kG/Min (3.92 mL/Hr) IV Continuous <Continuous>  senna 2 Tablet(s) Oral at bedtime    MEDICATIONS  (PRN):  acetaminophen   Tablet .. 650 milliGRAM(s) Oral every 6 hours PRN Temp greater or equal to 38C (100.4F)  dextrose 40% Gel 15 Gram(s) Oral once PRN Blood Glucose LESS THAN 70 milliGRAM(s)/deciliter  glucagon  Injectable 1 milliGRAM(s) IntraMuscular once PRN Glucose LESS THAN 70 milligrams/deciliter  sodium chloride 0.65% Nasal 1 Spray(s) Both Nostrils every 8 hours PRN Nasal Congestion    Vital Signs Last 24 Hrs  T(C): 35.3 (2020 09:15), Max: 36.8 (2020 01:04)  T(F): 95.5 (2020 09:15), Max: 98.3 (2020 01:04)  HR: 100 (2020 18:00) (68 - 102)  BP: 106/61 (2020 07:15) (106/61 - 169/95)  BP(mean): 78 (2020 07:15) (78 - 125)  RR: 24 (2020 18:00) (15 - 31)  SpO2: 96% (2020 18:00) (86% - 98%)      PHYSICAL EXAM:  CHEST/LUNG: dec breath sounds at bases  HEART:  S1 , S2 +  ABDOMEN: soft , bs+  EXTREMITIES:  no edema  NEUROLOGY:sedated     LABS:      138  |  95<L>  |  16  ----------------------------<  186<H>  3.4<L>   |  24  |  0.62    Ca    8.4      2020 06:22  Phos  4.3       Mg     2.6         TPro  7.9  /  Alb  3.6  /  TBili  0.7  /  DBili      /  AST  165<H>  /  ALT  270<H>  /  AlkPhos  298<H>      Creatinine Trend: 0.62 <--, 0.78 <--, 0.82 <--, 0.79 <--                        14.1   16.90 )-----------( 366      ( 2020 06:22 )             42.7     Urine Studies:  Urinalysis Basic - ( 29 Mar 2020 01:58 )    Color: Light Yellow / Appearance: Clear / S.012 / pH:   Gluc:  / Ketone: Negative  / Bili: Negative / Urobili: Negative   Blood:  / Protein: 30 mg/dL / Nitrite: Negative   Leuk Esterase: Negative / RBC: 0 /hpf / WBC 1 /HPF   Sq Epi:  / Non Sq Epi: 0 /hpf / Bacteria: Negative        CARDIAC MARKERS ( 2020 06:22 )  x     / x     / 513 U/L / x     / 8.0 ng/mL      ABG - ( 2020 15:21 )  pH, Arterial: 7.28  pH, Blood: x     /  pCO2: 68    /  pO2: 118   / HCO3: 31    / Base Excess: 2.9   /  SaO2: 98                LIVER FUNCTIONS - ( 2020 06:22 )  Alb: 3.6 g/dL / Pro: 7.9 g/dL / ALK PHOS: 298 U/L / ALT: 270 U/L / AST: 165 U/L / GGT: x           PT/INR - ( 2020 06:23 )   PT: 12.5 sec;   INR: 1.08 ratio         PTT - ( 2020 06:23 )  PTT:22.6 sec    Care Discussed with Consultants/Other Providers:

## 2020-04-03 NOTE — RAPID RESPONSE TEAM SUMMARY - NSADDTLFINDINGSRRT_GEN_ALL_CORE
RRT called for hypoxia to low 80s after prior interventions.  MICU team at bedside.  Pt with AMS at this time.  Decision made to intubate and pt bought to ICU.

## 2020-04-03 NOTE — CONSULT NOTE ADULT - SUBJECTIVE AND OBJECTIVE BOX
HPI:  Pt is a 50 yo M with no PMH admitted with Covid-19 positive, now intubated/sedated and mechanically ventilated after worsening of hypoxia and AMS. Neurology consulted after portable CT head showed low attenuation in the right cerebellum as well as the medial occipital and parietal regions bilaterally thought to be artifact vs acute infarct. Prior to intubation pt had not been noted to have or complained of any focal neurological deficits.      MEDICATIONS  (STANDING):  anakinra Injectable 100 milliGRAM(s) SubCutaneous <User Schedule>  chlorhexidine 0.12% Liquid 15 milliLiter(s) Oral Mucosa every 12 hours  chlorhexidine 4% Liquid 1 Application(s) Topical <User Schedule>  cisatracurium Infusion 3 MICROgram(s)/kG/Min (14.7 mL/Hr) IV Continuous <Continuous>  dextrose 5%. 1000 milliLiter(s) (50 mL/Hr) IV Continuous <Continuous>  dextrose 50% Injectable 12.5 Gram(s) IV Push once  dextrose 50% Injectable 25 Gram(s) IV Push once  dextrose 50% Injectable 25 Gram(s) IV Push once  enoxaparin Injectable 40 milliGRAM(s) SubCutaneous every 12 hours  fentaNYL   Infusion. 0.5 MICROgram(s)/kG/Hr (2.04 mL/Hr) IV Continuous <Continuous>  methylPREDNISolone sodium succinate Injectable 40 milliGRAM(s) IV Push every 12 hours  midazolam Infusion 0.02 mG/kG/Hr (1.63 mL/Hr) IV Continuous <Continuous>  norepinephrine Infusion 0.05 MICROgram(s)/kG/Min (7.65 mL/Hr) IV Continuous <Continuous>  polyethylene glycol 3350 17 Gram(s) Oral daily  propofol Infusion 10 MICROgram(s)/kG/Min (4.9 mL/Hr) IV Continuous <Continuous>  rocuronium Infusion 4 MICROgram(s)/kG/Min (3.92 mL/Hr) IV Continuous <Continuous>  senna 2 Tablet(s) Oral at bedtime    MEDICATIONS  (PRN):  acetaminophen   Tablet .. 650 milliGRAM(s) Oral every 6 hours PRN Temp greater or equal to 38C (100.4F)  dextrose 40% Gel 15 Gram(s) Oral once PRN Blood Glucose LESS THAN 70 milliGRAM(s)/deciliter  glucagon  Injectable 1 milliGRAM(s) IntraMuscular once PRN Glucose LESS THAN 70 milligrams/deciliter  sodium chloride 0.65% Nasal 1 Spray(s) Both Nostrils every 8 hours PRN Nasal Congestion    PAST MEDICAL & SURGICAL HISTORY:  History of umbilical hernia  H/O right knee surgery: 2004 - ACL, meniscus tear  History of left knee surgery: 1992 - ACL, meniscus tear    FAMILY HISTORY:    Allergies    No Known Drug Allergies  pollen (Rhinorrhea)    Intolerances    SHx - No smoking, No ETOH, No drug abuse    Review of Systems:  See HPI.    Vital Signs Last 24 Hrs  T(C): 35.3 (03 Apr 2020 09:15), Max: 36.8 (03 Apr 2020 01:04)  T(F): 95.5 (03 Apr 2020 09:15), Max: 98.3 (03 Apr 2020 01:04)  HR: 99 (03 Apr 2020 15:00) (68 - 102)  BP: 106/61 (03 Apr 2020 07:15) (106/61 - 169/95)  BP(mean): 78 (03 Apr 2020 07:15) (78 - 125)  RR: 24 (03 Apr 2020 15:00) (15 - 31)  SpO2: 94% (03 Apr 2020 15:00) (86% - 98%)      Exam:  - Deferred for now given low utility      04-03    138  |  95<L>  |  16  ----------------------------<  186<H>  3.4<L>   |  24  |  0.62    Ca    8.4      03 Apr 2020 06:22  Phos  4.3     04-03  Mg     2.6     04-03    TPro  7.9  /  Alb  3.6  /  TBili  0.7  /  DBili  x   /  AST  165<H>  /  ALT  270<H>  /  AlkPhos  298<H>  04-03               14.1   16.90 )-----------( 366      ( 03 Apr 2020 06:22 )             42.7

## 2020-04-04 NOTE — CONSULT NOTE ADULT - SUBJECTIVE AND OBJECTIVE BOX
p (1480)     HPI:  49 y.o. M w/ no significant PMHx p/w fevers, cough, myalgias, headache since six days.  +sick contacts at home w/ family members, nobody has been tested for covid.  Pt. called pmd on Monday who stated to watch sxs and go to ED if he becomes SOB.  Pt. started developing SOB yesterday and today had tmax of 103.  Pt. has been taking tylenol around the clock w/ good relief of sxs.  Pt. has also had nb persistent diarrhea for the last three days.  Pt. denies any respiratory or smoking hx (29 Mar 2020 13:52)    Exam:  intubated, no eo, pupils 2mm sluggish, no corneals, flaccid x4,   --Anticoagulation:  aspirin  chewable 81 milliGRAM(s) Oral daily    =====================  PAST MEDICAL HISTORY   History of umbilical hernia    PAST SURGICAL HISTORY   H/O right knee surgery  History of left knee surgery    pollen (Rhinorrhea)      MEDICATIONS:  Antibiotics:    Neuro:  acetaminophen   Tablet .. 650 milliGRAM(s) Oral every 6 hours PRN  fentaNYL   Infusion. 0.5 MICROgram(s)/kG/Hr IV Continuous <Continuous>  midazolam Infusion 0.02 mG/kG/Hr IV Continuous <Continuous>  propofol Infusion 10.008 MICROgram(s)/kG/Min IV Continuous <Continuous>  rocuronium Infusion 4 MICROgram(s)/kG/Min IV Continuous <Continuous>    Other:  dextrose 40% Gel 15 Gram(s) Oral once PRN  dextrose 5%. 1000 milliLiter(s) IV Continuous <Continuous>  dextrose 50% Injectable 12.5 Gram(s) IV Push once  dextrose 50% Injectable 25 Gram(s) IV Push once  dextrose 50% Injectable 25 Gram(s) IV Push once  glucagon  Injectable 1 milliGRAM(s) IntraMuscular once PRN  norepinephrine Infusion 0.05 MICROgram(s)/kG/Min IV Continuous <Continuous>  polyethylene glycol 3350 17 Gram(s) Oral daily  senna 2 Tablet(s) Oral at bedtime      SOCIAL HISTORY:   Occupation:   Marital Status:     FAMILY HISTORY:      ROS: Negative except per HPI    LABS:  PT/INR - ( 04 Apr 2020 00:49 )   PT: 13.1 sec;   INR: 1.14 ratio         PTT - ( 03 Apr 2020 06:23 )  PTT:22.6 sec                        12.6   12.44 )-----------( 156      ( 04 Apr 2020 00:56 )             38.8     04-04    139  |  99  |  16  ----------------------------<  152<H>  5.3   |  28  |  0.93    Ca    8.3<L>      04 Apr 2020 00:56  Phos  3.6     04-04  Mg     3.1     04-04    TPro  6.9  /  Alb  3.3  /  TBili  0.4  /  DBili  x   /  AST  98<H>  /  ALT  227<H>  /  AlkPhos  227<H>  04-04

## 2020-04-04 NOTE — PROGRESS NOTE ADULT - ASSESSMENT
49 y.o. M w/ no significant PMHx p/w fevers, cough, myalgias, headache found to have acute hypoxic respiratory failure 2/2 COVID19, s/p intubation on 4/3.    4/3 plan hightlights  plan to leave proned until 5 am 4/4/20  wean sedation as tolerated  neuro consult for CT findings - repeat CT tomorrow when supine  hold off a/c until 4/4    #Neuro  - AxO x 3 at baseline, concern for delirium 2/2 unclear cause. DDx- microthrombi 2/2 COVID19 vs hyperammonemia vs hypercapnia, vs seizure vs stroke vs hyponatremia  - intubated and sedated on propofol  - CTH given acute onset mental status changes - with some ?PCA territory finding - neurolgoy consult    #Resp  - Acute Hypoxic Respiratory failure 2/2 COVID19 (possible terminal bronchial obstruction)- intubated on 4/3  ABG showed pH- 7.42, pCO2- 39 and pO2- 62  - CTA- No pulmonary embolus to the level of the lobar arteries. Diffuse ground-glass opacities in both lungs with increased consolidations in the periphery, in keeping with known COVID-19 infection.   - titrate vent to ARDSnet   - F/u repeat ABG  - will prone 18h and supine 6h    #CV  - no active issues    #GI  - will keep NPO for now  - on senna and Miralax  - s/p Reglan 10 mg x 2 for NBNB emesis    #Transaminitis  - Likely 2/2 sepsis 2/2 COVID19  - will continue to monitor  - if uptrending, will consider obtaining US RUQ Abd  - AXR to r/o ileus given NBNB emesis    #ID  - Urine and Blood cx (3/29)- NGTD, Rapid Flu- negative and Legionella- negative  - COVID19 PCR positive- 3/29  - F/u COVID-19 workups:     -- On Admission: CBC w/ diff, CMP/mg/phos, Coags, D-dimer, procalcitonin, ESR/CRP, LDH, ferritin, lactate, T cell subset, CK, Trop, G6PD, natural killer cells and IL6/8; Lung scores and cardiac ultrasound    -- Daily: CBC w/ diff, CMP/mg/phos, fibrinogen, and CRP    -- Every 3 days: T cell subset, D-dimer, LDH, Ferritin, CK, trop and coags  - s/p Plaquenil, x 4 days, CTX/AZT (3/29). Per ID recs, does not meet criteria for IL-6 2/2 LFTs  - Patient is currently on Anakinra (s/p 2 doses). Will continue for total 3 days  - c/w Solu-Medrol 40 mg BID (D2/5)    #Renal  - no active issues, monitor I/O  - Cr- 0.78  - IV Lasix 40 mg for goal negative 2L  - monitor bmp    #Heme  - H/H stable  - no active issues    #Endo  - no active issues  - will start LDSSI and monitor FS Q6H    #DVT PPx  - d dimer elevated - will trend  - off a/c for CT head findings 4/3 - will restart if repeat ct head stable without homrrhage    #GOC/Ethics- FULL CODE  - Emergency Contact- Jonas Mcleod= [phone] 49 y.o. M w/ no significant PMHx p/w fevers, cough, myalgias, headache found to have acute hypoxic respiratory failure 2/2 COVID19, s/p intubation on 4/3.    4/4 plan hightlights  supinated at 9 am - required a touch of levophed now off  wean sedation as tolerated  neuro consult for CT findings - repeat CT, f/u neurology  hold off a/c until after head CT  good compliance - decrease vent settings as tolerated    #Neuro  - AxO x 3 at baseline, concern for delirium 2/2 unclear cause. DDx- microthrombi 2/2 COVID19 vs hyperammonemia vs hypercapnia, vs seizure vs stroke vs hyponatremia  - intubated and sedated on propofol  - CTH given acute onset mental status changes - with some ?PCA territory finding - neurology consult  - repeat head CT 4/4    #Resp  - Acute Hypoxic Respiratory failure 2/2 COVID19 (possible terminal bronchial obstruction)- intubated on 4/3  ABG showed pH- 7.42, pCO2- 39 and pO2- 62  - CTA - No pulmonary embolus to the level of the lobar arteries. Diffuse ground-glass opacities in both lungs with increased consolidations in the periphery, in keeping with known COVID-19 infection.   - titrate vent to ARDSnet   - ABG with vent changes  - good compliance  - d-dimer uptrending - will want full a/c with lovenox if no stroke contraindications    #CV  - no active issues    #GI  - will keep NPO for now  - on senna and Miralax  - s/p Reglan 10 mg x 2 for NBNB emesis    #Transaminitis  - Likely 2/2 sepsis 2/2 COVID19  - will continue to monitor  - if uptrending, will consider obtaining US RUQ Abd  - AXR to r/o ileus given NBNB emesis    #ID  - Urine and Blood cx (3/29) - NGTD, Rapid Flu- negative and Legionella- negative  - COVID19 PCR positive- 3/29  - F/u COVID-19 workups:     -- On Admission: CBC w/ diff, CMP/mg/phos, Coags, D-dimer, procalcitonin, ESR/CRP, LDH, ferritin, lactate, T cell subset, CK, Trop, G6PD, natural killer cells and IL6/8; Lung scores and cardiac ultrasound    -- Daily: CBC w/ diff, CMP/mg/phos, fibrinogen, and CRP    -- Every 3 days: T cell subset, D-dimer, LDH, Ferritin, CK, trop and coags  - s/p Plaquenil, x 4 days, CTX/AZT (3/29). Per ID recs, does not meet criteria for IL-6 2/2 LFTs  - Patient is currently on Anakinra (s/p 2 doses). Will continue for total 3 days  - c/w Solu-Medrol 40 mg BID x 5 days  - procal stable, downtrending    #Renal  - no active issues, monitor I/O  - Cr- 0.78 baseline  - holding diuresis as good uop without  - monitor bmp    #Heme  - H/H stable  - no active issues    #Endo  - no active issues  - will start LDSSI and monitor FS Q6H    #DVT PPx  - d dimer elevated - will trend  - off a/c for CT head findings 4/3 - will restart if repeat ct head 4/4 stable without hemorrhage    #C/Ethics- FULL CODE  - Emergency Contact- Jonas Mcleod= [phone] 49 y.o. M w/ no significant PMHx p/w fevers, cough, myalgias, headache found to have acute hypoxic respiratory failure 2/2 COVID19, s/p intubation on 4/3.    4/4 plan hightlights  supinated at 9 am - required a touch of levophed now off  wean sedation as tolerated  neuro consult for CT findings - repeat CT, f/u neurology  hold off a/c until after head CT  good compliance - decrease vent settings as tolerated    #Neuro  - AxO x 3 at baseline, concern for delirium 2/2 unclear cause. DDx- microthrombi 2/2 COVID19 vs hyperammonemia vs hypercapnia, vs seizure vs stroke vs hyponatremia  - intubated and sedated on propofol  - CTH given acute onset mental status changes - with some ?PCA territory finding - neurology consult  - repeat head CT 4/4 showing diffuse vasogenic edema with f/u urgent CTA head/neck showing possible PCA territory infarct bilaterally  - neurosurgery consulted to evaluate for possible OR intervention  - neurology following  - will maintain Na 145-155  - -140 goal  - grim prognosis  - continue aspirin    #Resp  - Acute Hypoxic Respiratory failure 2/2 COVID19 (possible terminal bronchial obstruction)- intubated on 4/3  ABG showed pH- 7.42, pCO2- 39 and pO2- 62  - CTA - No pulmonary embolus to the level of the lobar arteries. Diffuse ground-glass opacities in both lungs with increased consolidations in the periphery, in keeping with known COVID-19 infection.   - titrate vent to ARDSnet   - ABG with vent changes  - good compliance  - d-dimer uptrending - will want full a/c with lovenox if no stroke contraindications    #CV  - no active issues    #GI  - will keep NPO for now  - on senna and Miralax  - s/p Reglan 10 mg x 2 for NBNB emesis    #Transaminitis  - Likely 2/2 sepsis 2/2 COVID19  - will continue to monitor  - if uptrending, will consider obtaining US RUQ Abd  - AXR to r/o ileus given NBNB emesis    #ID  - Urine and Blood cx (3/29) - NGTD, Rapid Flu- negative and Legionella- negative  - COVID19 PCR positive- 3/29  - F/u COVID-19 workups:     -- On Admission: CBC w/ diff, CMP/mg/phos, Coags, D-dimer, procalcitonin, ESR/CRP, LDH, ferritin, lactate, T cell subset, CK, Trop, G6PD, natural killer cells and IL6/8; Lung scores and cardiac ultrasound    -- Daily: CBC w/ diff, CMP/mg/phos, fibrinogen, and CRP    -- Every 3 days: T cell subset, D-dimer, LDH, Ferritin, CK, trop and coags  - s/p Plaquenil, x 4 days, CTX/AZT (3/29). Per ID recs, does not meet criteria for IL-6 2/2 LFTs  - Patient is currently on Anakinra (s/p 2 doses). Will continue for total 3 days  - c/w Solu-Medrol 40 mg BID x 5 days  - procal stable, downtrending    #Renal  - no active issues, monitor I/O  - Cr- 0.78 baseline  - holding diuresis as good uop without  - monitor bmp    #Heme  - H/H stable  - no active issues    #Endo  - no active issues  - will start LDSSI and monitor FS Q6H    #DVT PPx  - d dimer elevated - will trend  - off a/c for CT head findings 4/3 - will restart if repeat ct head 4/4 stable without hemorrhage    #GOC/Ethics- FULL CODE  - Emergency Contact- Jonas Mcleod= [phone] 49 y.o. M w/ no significant PMHx p/w fevers, cough, myalgias, headache found to have acute hypoxic respiratory failure 2/2 COVID19, s/p intubation on 4/3.    4/4 plan hightlights  supinated at 9 am - required a touch of levophed now off  wean sedation as tolerated  repeat head CT 4/4 showing diffuse vasogenic edema with f/u urgent CTA head/neck showing possible PCA territory infarct bilaterally  neurosurgery consulted to evaluate for possible OR intervention  neurology following  will maintain Na 145-155  -140 goal  grim prognosis  continue aspirin  good compliance - decrease vent settings as tolerated    #Neuro  - AxO x 3 at baseline, concern for delirium 2/2 unclear cause. DDx- microthrombi 2/2 COVID19 vs hyperammonemia vs hypercapnia, vs seizure vs stroke vs hyponatremia  - intubated and sedated on propofol  - CTH given acute onset mental status changes - with some ?PCA territory finding - neurology consult  - repeat head CT 4/4 showing diffuse vasogenic edema with f/u urgent CTA head/neck showing possible PCA territory infarct bilaterally  - neurosurgery consulted to evaluate for possible OR intervention  - neurology following  - will maintain Na 145-155  - -140 goal  - grim prognosis  - continue aspirin    #Resp  - Acute Hypoxic Respiratory failure 2/2 COVID19 (possible terminal bronchial obstruction)- intubated on 4/3  ABG showed pH- 7.42, pCO2- 39 and pO2- 62  - CTA - No pulmonary embolus to the level of the lobar arteries. Diffuse ground-glass opacities in both lungs with increased consolidations in the periphery, in keeping with known COVID-19 infection.   - titrate vent to ARDSnet   - ABG with vent changes  - good compliance  - d-dimer uptrending - will want full a/c with lovenox if no stroke contraindications    #CV  - no active issues    #GI  - will keep NPO for now  - on senna and Miralax  - s/p Reglan 10 mg x 2 for NBNB emesis    #Transaminitis  - Likely 2/2 sepsis 2/2 COVID19  - will continue to monitor  - if uptrending, will consider obtaining US RUQ Abd  - AXR to r/o ileus given NBNB emesis    #ID  - Urine and Blood cx (3/29) - NGTD, Rapid Flu- negative and Legionella- negative  - COVID19 PCR positive- 3/29  - F/u COVID-19 workups:     -- On Admission: CBC w/ diff, CMP/mg/phos, Coags, D-dimer, procalcitonin, ESR/CRP, LDH, ferritin, lactate, T cell subset, CK, Trop, G6PD, natural killer cells and IL6/8; Lung scores and cardiac ultrasound    -- Daily: CBC w/ diff, CMP/mg/phos, fibrinogen, and CRP    -- Every 3 days: T cell subset, D-dimer, LDH, Ferritin, CK, trop and coags  - s/p Plaquenil, x 4 days, CTX/AZT (3/29). Per ID recs, does not meet criteria for IL-6 2/2 LFTs  - Patient is currently on Anakinra (s/p 2 doses). Will continue for total 3 days  - c/w Solu-Medrol 40 mg BID x 5 days  - procal stable, downtrending    #Renal  - no active issues, monitor I/O  - Cr- 0.78 baseline  - holding diuresis as good uop without  - monitor bmp    #Heme  - H/H stable  - no active issues    #Endo  - no active issues  - will start LDSSI and monitor FS Q6H    #DVT PPx  - d dimer elevated - will trend  - off a/c for CT head findings 4/3 - will restart if repeat ct head 4/4 stable without hemorrhage    #[facility]/Ethics- FULL CODE  - Emergency Contact- Jonas Mcleod= [phone]

## 2020-04-04 NOTE — CONSULT NOTE ADULT - REASON FOR ADMISSION
fever , cough , shortness of breath

## 2020-04-04 NOTE — CHART NOTE - NSCHARTNOTEFT_GEN_A_CORE
Documentation to reflect Neurology services attention to most recent CT Head findings:     Large areas of acute infarction involve the cerebellar and cerebral hemispheres with associated acute hydrocephalus and brain herniation.      Given multi-territorial distribution (SCA, PCA, seemingly inferior portion of L MCA) and current COVID+ status these infarcts are likely secondary to cardio-embolism possibly in the setting of Atrial Fibrillation vs DIC. Vasculitis remains in differential as well but is less likely. Prognosis is guarded to poor. Goals of care may warrant reviewing      Recommendations:    Obtain Neurosurgical input regarding acute hydrocephalus and brain herniation  Can continue with Aspirin 81mg qd unless Neurosurgery says otherwise for operative purposes  Obtain CTA Head and Neck  Obtain TTE when feasible   Maintain cardiac telemetry  DIC workup  Review goals of care              Discussed w/ Neurology Attending Documentation to reflect Neurology services attention to most recent CT Head findings:     Large areas of acute infarction involve the cerebellar and cerebral hemispheres with associated acute hydrocephalus and brain herniation.      Given multi-territorial distribution (SCA, PCA, seemingly inferior portion of L MCA) and current COVID+ status these infarcts are likely secondary to cardio-embolism possibly in the setting of Atrial Fibrillation vs DIC. Vasculitis remains in differential as well but is less likely. Prognosis is guarded to poor.      Recommendations:    Obtain Neurosurgical input regarding acute hydrocephalus and brain herniation  Can continue with Aspirin 81mg qd unless Neurosurgery says otherwise for operative purposes  Obtain CTA Head and Neck  Obtain TTE when feasible   Maintain cardiac telemetry  DIC workup              Discussed w/ Neurology Attending Documentation to reflect Neurology services attention to most recent CT Head findings:     Large areas of acute infarction involve the cerebellar and cerebral hemispheres with associated acute hydrocephalus and brain herniation.      Given multi-territorial distribution (SCA, PCA, seemingly inferior portion of L MCA) and current COVID+ status these infarcts are likely secondary to cardio-embolism possibly in the setting of Atrial Fibrillation vs DIC. Vasculitis remains in differential as well but is less likely. Prognosis is guarded to poor.      Recommendations:    Obtain Neurosurgical input regarding acute hydrocephalus and brain herniation  Can continue with Aspirin 81mg qd unless Neurosurgery says otherwise for operative purposes  Obtain CTA Head and Neck  Obtain TTE when feasible   Maintain cardiac telemetry  DIC workup              Discussed w/ Neurology Attending    Stroke attending. Agree with above

## 2020-04-04 NOTE — GOALS OF CARE CONVERSATION - ADVANCED CARE PLANNING - CONVERSATION DETAILS
Discussed prognosis and diagnosis.  Patient is very likely to have minimal if any neurological function after this stroke.  He is not a candidate for any intervention as that would not improve his outcome.  given this I informed the family that should he deteriorate further we will not be offering CPR.  They understood.  But would like celeste time to process before discussion any deescalation.

## 2020-04-04 NOTE — PROGRESS NOTE ADULT - SUBJECTIVE AND OBJECTIVE BOX
Paul Wells MD  Internal Medicine Resident  [phone]    PATIENT:  KADY VILLAR  [id number]    CHIEF COMPLAINT:  Patient is a 49y old  Male who presents with a chief complaint of fever , cough , shortness of breath (03 Apr 2020 17:03)      INTERVAL HISTORY/OVERNIGHT EVENTS:      REVIEW OF SYSTEMS:    Constitutional:     [ ] negative [ ] fevers [ ] chills [ ] weight loss [ ] weight gain  HEENT:                  [ ] negative [ ] dry eyes [ ] eye irritation [ ] postnasal drip [ ] nasal congestion  CV:                         [ ] negative  [ ] chest pain [ ] orthopnea [ ] palpitations [ ] murmur  Resp:                     [ ] negative [ ] cough [ ] shortness of breath [ ] dyspnea [ ] wheezing [ ] sputum [ ] hemoptysis  GI:                          [ ] negative [ ] nausea [ ] vomiting [ ] diarrhea [ ] constipation [ ] abd pain [ ] dysphagia   :                        [ ] negative [ ] dysuria [ ] nocturia [ ] hematuria [ ] increased urinary frequency  Musculoskeletal: [ ] negative [ ] back pain [ ] myalgias [ ] arthralgias [ ] fracture  Skin:                       [ ] negative [ ] rash [ ] itch  Neurological:        [ ] negative [ ] headache [ ] dizziness [ ] syncope [ ] weakness [ ] numbness  Psychiatric:           [ ] negative [ ] anxiety [ ] depression  Endocrine:            [ ] negative [ ] diabetes [ ] thyroid problem  Heme/Lymph:      [ ] negative [ ] anemia [ ] bleeding problem  Allergic/Immune: [ ] negative [ ] itchy eyes [ ] nasal discharge [ ] hives [ ] angioedema    [ ] All other systems negative  [ ] Unable to assess ROS because ________.    MEDICATIONS:  MEDICATIONS  (STANDING):  anakinra Injectable 100 milliGRAM(s) SubCutaneous <User Schedule>  aspirin enteric coated 81 milliGRAM(s) Oral daily  chlorhexidine 0.12% Liquid 15 milliLiter(s) Oral Mucosa every 12 hours  chlorhexidine 4% Liquid 1 Application(s) Topical <User Schedule>  dextrose 5%. 1000 milliLiter(s) (50 mL/Hr) IV Continuous <Continuous>  dextrose 50% Injectable 12.5 Gram(s) IV Push once  dextrose 50% Injectable 25 Gram(s) IV Push once  dextrose 50% Injectable 25 Gram(s) IV Push once  fentaNYL   Infusion. 0.5 MICROgram(s)/kG/Hr (2.04 mL/Hr) IV Continuous <Continuous>  midazolam Infusion 0.02 mG/kG/Hr (1.63 mL/Hr) IV Continuous <Continuous>  norepinephrine Infusion 0.05 MICROgram(s)/kG/Min (7.65 mL/Hr) IV Continuous <Continuous>  polyethylene glycol 3350 17 Gram(s) Oral daily  propofol Infusion 10.008 MICROgram(s)/kG/Min (4.9 mL/Hr) IV Continuous <Continuous>  rocuronium Infusion 4 MICROgram(s)/kG/Min (3.92 mL/Hr) IV Continuous <Continuous>  senna 2 Tablet(s) Oral at bedtime    MEDICATIONS  (PRN):  acetaminophen   Tablet .. 650 milliGRAM(s) Oral every 6 hours PRN Temp greater or equal to 38C (100.4F)  dextrose 40% Gel 15 Gram(s) Oral once PRN Blood Glucose LESS THAN 70 milliGRAM(s)/deciliter  glucagon  Injectable 1 milliGRAM(s) IntraMuscular once PRN Glucose LESS THAN 70 milligrams/deciliter  sodium chloride 0.65% Nasal 1 Spray(s) Both Nostrils every 8 hours PRN Nasal Congestion      ALLERGIES:  Allergies    No Known Drug Allergies  pollen (Rhinorrhea)    Intolerances        OBJECTIVE:  ICU Vital Signs Last 24 Hrs  T(C): 38.6 (04 Apr 2020 08:00), Max: 39.3 (04 Apr 2020 04:00)  T(F): 101.4 (04 Apr 2020 08:00), Max: 102.8 (04 Apr 2020 04:00)  HR: 101 (04 Apr 2020 08:00) (83 - 121)  BP: --  BP(mean): --  ABP: 102/60 (04 Apr 2020 08:00) (102/60 - 127/77)  ABP(mean): 74 (04 Apr 2020 08:00) (74 - 95)  RR: 24 (04 Apr 2020 08:00) (24 - 28)  SpO2: 94% (04 Apr 2020 08:00) (91% - 97%)    Mode: AC/ CMV (Assist Control/ Continuous Mandatory Ventilation)  RR (machine): 24  TV (machine): 460  FiO2: 50  PEEP: 12  ITime: 0.75  MAP: 16  PIP: 33    Adult Advanced Hemodynamics Last 24 Hrs  CVP(mm Hg): --  CVP(cm H2O): --  CO: --  CI: --  PA: --  PA(mean): --  PCWP: --  SVR: --  SVRI: --  PVR: --  PVRI: --  CAPILLARY BLOOD GLUCOSE      POCT Blood Glucose.: 143 mg/dL (03 Apr 2020 17:52)    CAPILLARY BLOOD GLUCOSE      POCT Blood Glucose.: 143 mg/dL (03 Apr 2020 17:52)    I&O's Summary    03 Apr 2020 07:01  -  04 Apr 2020 07:00  --------------------------------------------------------  IN: 1396.3 mL / OUT: 1785 mL / NET: -388.7 mL    04 Apr 2020 07:01  -  04 Apr 2020 08:44  --------------------------------------------------------  IN: 50.1 mL / OUT: 100 mL / NET: -49.9 mL      Daily     Daily     PHYSICAL EXAMINATION:  General: WN/WD NAD  HEENT: PERRLA, EOMI, moist mucous membranes  Neurology: A&Ox3, nonfocal, DUMONT x 4  Respiratory: CTA B/L, normal respiratory effort, no wheezes, crackles, rales  CV: RRR, S1S2, no murmurs, rubs or gallops  Abdominal: Soft, NT, ND +BS, Last BM  Extremities: No edema, + peripheral pulses  Incisions:   Tubes:    LABS:  ABG - ( 04 Apr 2020 00:47 )  pH, Arterial: 7.32  pH, Blood: x     /  pCO2: 66    /  pO2: 151   / HCO3: 33    / Base Excess: 5.6   /  SaO2: 99                                      12.6   12.44 )-----------( 156      ( 04 Apr 2020 00:56 )             38.8     04-04    139  |  99  |  16  ----------------------------<  152<H>  5.3   |  28  |  0.93    Ca    8.3<L>      04 Apr 2020 00:56  Phos  3.6     04-04  Mg     3.1     04-04    TPro  6.9  /  Alb  3.3  /  TBili  0.4  /  DBili  x   /  AST  98<H>  /  ALT  227<H>  /  AlkPhos  227<H>  04-04    LIVER FUNCTIONS - ( 04 Apr 2020 00:56 )  Alb: 3.3 g/dL / Pro: 6.9 g/dL / ALK PHOS: 227 U/L / ALT: 227 U/L / AST: 98 U/L / GGT: x           PT/INR - ( 04 Apr 2020 00:49 )   PT: 13.1 sec;   INR: 1.14 ratio         PTT - ( 03 Apr 2020 06:23 )  PTT:22.6 sec  CKMB Units: 2.6 ng/mL (04-04 @ 00:56)  Creatine Kinase, Serum: 127 U/L (04-04 @ 00:56)    CARDIAC MARKERS ( 04 Apr 2020 00:56 )  x     / x     / 127 U/L / x     / 2.6 ng/mL  CARDIAC MARKERS ( 03 Apr 2020 06:22 )  x     / x     / 513 U/L / x     / 8.0 ng/mL          TELEMETRY:     EKG:     IMAGING: Paul Wells MD  Internal Medicine Resident  [phone]    PATIENT:  KADY VILLAR  [id number]    CHIEF COMPLAINT:  Patient is a 49y old  Male who presents with a chief complaint of fever , cough , shortness of breath (03 Apr 2020 17:03)    INTERVAL HISTORY/OVERNIGHT EVENTS:  overnight  febrile  got Tylenol  uop 75 cc/hour  fentanyl, versed, propofol, minimal levophed, rocuronium  d dimer uptrending significantly  head ct from yesterday portable had possible PCA territory finding vs artifact  supinated 9 am 4/4/20    MEDICATIONS:  MEDICATIONS  (STANDING):  anakinra Injectable 100 milliGRAM(s) SubCutaneous <User Schedule>  aspirin enteric coated 81 milliGRAM(s) Oral daily  chlorhexidine 0.12% Liquid 15 milliLiter(s) Oral Mucosa every 12 hours  chlorhexidine 4% Liquid 1 Application(s) Topical <User Schedule>  dextrose 5%. 1000 milliLiter(s) (50 mL/Hr) IV Continuous <Continuous>  dextrose 50% Injectable 12.5 Gram(s) IV Push once  dextrose 50% Injectable 25 Gram(s) IV Push once  dextrose 50% Injectable 25 Gram(s) IV Push once  fentaNYL   Infusion. 0.5 MICROgram(s)/kG/Hr (2.04 mL/Hr) IV Continuous <Continuous>  midazolam Infusion 0.02 mG/kG/Hr (1.63 mL/Hr) IV Continuous <Continuous>  norepinephrine Infusion 0.05 MICROgram(s)/kG/Min (7.65 mL/Hr) IV Continuous <Continuous>  polyethylene glycol 3350 17 Gram(s) Oral daily  propofol Infusion 10.008 MICROgram(s)/kG/Min (4.9 mL/Hr) IV Continuous <Continuous>  rocuronium Infusion 4 MICROgram(s)/kG/Min (3.92 mL/Hr) IV Continuous <Continuous>  senna 2 Tablet(s) Oral at bedtime    MEDICATIONS  (PRN):  acetaminophen   Tablet .. 650 milliGRAM(s) Oral every 6 hours PRN Temp greater or equal to 38C (100.4F)  dextrose 40% Gel 15 Gram(s) Oral once PRN Blood Glucose LESS THAN 70 milliGRAM(s)/deciliter  glucagon  Injectable 1 milliGRAM(s) IntraMuscular once PRN Glucose LESS THAN 70 milligrams/deciliter  sodium chloride 0.65% Nasal 1 Spray(s) Both Nostrils every 8 hours PRN Nasal Congestion    ALLERGIES:  Allergies  No Known Drug Allergies  pollen (Rhinorrhea)    OBJECTIVE:  ICU Vital Signs Last 24 Hrs  T(C): 38.6 (04 Apr 2020 08:00), Max: 39.3 (04 Apr 2020 04:00)  T(F): 101.4 (04 Apr 2020 08:00), Max: 102.8 (04 Apr 2020 04:00)  HR: 101 (04 Apr 2020 08:00) (83 - 121)  ABP: 102/60 (04 Apr 2020 08:00) (102/60 - 127/77)  ABP(mean): 74 (04 Apr 2020 08:00) (74 - 95)  RR: 24 (04 Apr 2020 08:00) (24 - 28)  SpO2: 94% (04 Apr 2020 08:00) (91% - 97%)    Mode: AC/ CMV (Assist Control/ Continuous Mandatory Ventilation)  RR (machine): 24  TV (machine): 460  FiO2: 50  PEEP: 12  ITime: 0.75  MAP: 16  PIP: 33    POCT Blood Glucose.: 143 mg/dL (03 Apr 2020 17:52)  POCT Blood Glucose.: 143 mg/dL (03 Apr 2020 17:52)    I&O's Summary    03 Apr 2020 07:01  -  04 Apr 2020 07:00  --------------------------------------------------------  IN: 1396.3 mL / OUT: 1785 mL / NET: -388.7 mL    04 Apr 2020 07:01  -  04 Apr 2020 08:44  --------------------------------------------------------  IN: 50.1 mL / OUT: 100 mL / NET: -49.9 mL    Exam:  intubated  sedated    LABS:  ABG - ( 04 Apr 2020 00:47 )  pH, Arterial: 7.32  pH, Blood: x     /  pCO2: 66    /  pO2: 151   / HCO3: 33    / Base Excess: 5.6   /  SaO2: 99                            12.6   12.44 )-----------( 156      ( 04 Apr 2020 00:56 )             38.8     04-04    139  |  99  |  16  ----------------------------<  152<H>  5.3   |  28  |  0.93    Ca    8.3<L>      04 Apr 2020 00:56  Phos  3.6     04-04  Mg     3.1     04-04    TPro  6.9  /  Alb  3.3  /  TBili  0.4  /  DBili  x   /  AST  98<H>  /  ALT  227<H>  /  AlkPhos  227<H>  04-04    LIVER FUNCTIONS - ( 04 Apr 2020 00:56 )  Alb: 3.3 g/dL / Pro: 6.9 g/dL / ALK PHOS: 227 U/L / ALT: 227 U/L / AST: 98 U/L / GGT: x           PT/INR - ( 04 Apr 2020 00:49 )   PT: 13.1 sec;   INR: 1.14 ratio    PTT - ( 03 Apr 2020 06:23 )  PTT:22.6 sec    CKMB Units: 2.6 ng/mL (04-04 @ 00:56)  Creatine Kinase, Serum: 127 U/L (04-04 @ 00:56)    CARDIAC MARKERS ( 04 Apr 2020 00:56 )  x     / x     / 127 U/L / x     / 2.6 ng/mL  CARDIAC MARKERS ( 03 Apr 2020 06:22 )  x     / x     / 513 U/L / x     / 8.0 ng/mL

## 2020-04-05 NOTE — PROGRESS NOTE ADULT - ASSESSMENT
49 y.o. M w/ no significant PMHx p/w fevers, cough, myalgias, headache found to have acute hypoxic respiratory failure 2/2 COVID19, s/p intubation on 4/3.    #Neuro  - AxO x 3 at baseline, concern for delirium 2/2 unclear cause. DDx- microthrombi 2/2 COVID19 vs hyperammonemia vs hypercapnia, vs seizure vs stroke vs hyponatremia  - intubated and sedated on propofol  - CTH given acute onset mental status changes - with some ?PCA territory finding - neurology consult  - repeat head CT 4/4 showing diffuse vasogenic edema with f/u urgent CTA head/neck showing possible PCA territory infarct bilaterally  - neurosurgery consulted to evaluate for possible OR intervention  - neurology following  - will maintain Na 145-155  - -140 goal  - grim prognosis  - continue aspirin    #Resp  - Acute Hypoxic Respiratory failure 2/2 COVID19 (possible terminal bronchial obstruction)- intubated on 4/3  ABG showed pH- 7.42, pCO2- 39 and pO2- 62  - CTA - No pulmonary embolus to the level of the lobar arteries. Diffuse ground-glass opacities in both lungs with increased consolidations in the periphery, in keeping with known COVID-19 infection.   - titrate vent to ARDSnet   - ABG with vent changes  - good compliance  - d-dimer uptrending - will want full a/c with lovenox if no stroke contraindications    #CV  - no active issues    #GI  - will keep NPO for now  - on senna and Miralax  - s/p Reglan 10 mg x 2 for NBNB emesis    #Transaminitis  - Likely 2/2 sepsis 2/2 COVID19  - will continue to monitor  - if uptrending, will consider obtaining US RUQ Abd  - AXR to r/o ileus given NBNB emesis    #ID  - Urine and Blood cx (3/29) - NGTD, Rapid Flu- negative and Legionella- negative  - COVID19 PCR positive- 3/29  - F/u COVID-19 workups:     -- On Admission: CBC w/ diff, CMP/mg/phos, Coags, D-dimer, procalcitonin, ESR/CRP, LDH, ferritin, lactate, T cell subset, CK, Trop, G6PD, natural killer cells and IL6/8; Lung scores and cardiac ultrasound    -- Daily: CBC w/ diff, CMP/mg/phos, fibrinogen, and CRP    -- Every 3 days: T cell subset, D-dimer, LDH, Ferritin, CK, trop and coags  - s/p Plaquenil, x 4 days, CTX/AZT (3/29). Per ID recs, does not meet criteria for IL-6 2/2 LFTs  - Patient is currently on Anakinra (s/p 2 doses). Will continue for total 3 days  - c/w Solu-Medrol 40 mg BID x 5 days  - procal stable, downtrending    #Renal  - no active issues, monitor I/O  - Cr- 0.78 baseline  - holding diuresis as good uop without  - monitor bmp    #Heme  - H/H stable  - no active issues    #Endo  - no active issues  - will start LDSSI and monitor FS Q6H    #DVT PPx  - d dimer elevated - will trend  - off a/c for CT head findings 4/3 - will restart if repeat ct head 4/4 stable without hemorrhage    #GOC/Ethics- FULL CODE  - Emergency Contact- Jonas Harsha= [phone] 49 y.o. M w/ no significant PMHx p/w fevers, cough, myalgias, headache found to have acute hypoxic respiratory failure 2/2 COVID19, s/p intubation on 4/3.    #Neuro  - AxO x 3 at baseline, concern for delirium 2/2 unclear cause. DDx- microthrombi 2/2 COVID19 vs hyperammonemia vs hypercapnia, vs seizure vs stroke vs hyponatremia  - intubated, sedatives discontinued   - CTH given acute onset mental status changes - with some ?PCA territory finding  - repeat head CT 4/4 showing diffuse vasogenic edema with f/u urgent CTA head/neck showing possible PCA territory infarct bilaterally  - per neurosurgery - no intervention to be made at this time, grim prognosis   - -140 goal  - grim prognosis  - continue aspirin    #Resp  - Acute Hypoxic Respiratory failure 2/2 COVID19 (possible terminal bronchial obstruction)- intubated on 4/3  ABG showed pH- 7.42, pCO2- 39 and pO2- 62  - CTA - No pulmonary embolus to the level of the lobar arteries. Diffuse ground-glass opacities in both lungs with increased consolidations in the periphery, in keeping with known COVID-19 infection.   - titrate vent to ARDSnet   - ABG with vent changes  - good compliance      #CV  - no active issues    #GI  - will keep NPO for now    #Transaminitis  - Likely 2/2 sepsis 2/2 COVID19      #ID  - Urine and Blood cx (3/29) - NGTD, Rapid Flu- negative and Legionella- negative  - COVID19 PCR positive- 3/29  - s/p Plaquenil, x 4 days, CTX/AZT (3/29). Per ID recs, does not meet criteria for IL-6 2/2 LFTs  - Anakinra discontinued d/t comfort measures   - Solumedrol discontinued d/t comfort measures     #Renal  - no active issues, monitor I/O  - Cr- 0.78 baseline  - holding diuresis as good uop without  - no further blood draws     #Heme  - H/H stable  - no active issues  - no further blood draws     #Endo  - no active issues      #DVT PPx  - no further blood draws     #GOC/Ethics-   - Now DNR (4/4 - )  - Now Comfort measures, pressors capped, medications halted (4/5 - )  - s/p family visit 4/5    - Emergency Contact- oJnas Mcleod= [phone]

## 2020-04-05 NOTE — PROGRESS NOTE ADULT - SUBJECTIVE AND OBJECTIVE BOX
Patient is a 49y old  Male who presents with a chief complaint of fever , cough , shortness of breath (04 Apr 2020 15:10)      24 hour events: ***    REVIEW OF SYSTEMS:  Constitutional: [ ] fevers [ ] chills [ ] weight loss [ ] weight gain  HEENT: [ ] dry eyes [ ] eye irritation [ ] postnasal drip [ ] nasal congestion  CV: [ ] chest pain [ ] orthopnea [ ] palpitations [ ] murmur  Resp: [ ] cough [ ] shortness of breath [ ] dyspnea [ ] wheezing [ ] sputum [ ] hemoptysis  GI: [ ] nausea [ ] vomiting [ ] diarrhea [ ] constipation [ ] abd pain [ ] dysphagia   : [ ] dysuria [ ] nocturia [ ] hematuria [ ] increased urinary frequency  Musculoskeletal: [ ] back pain [ ] myalgias [ ] arthralgias [ ] fracture  Skin: [ ] rash [ ] itch  Neurological: [ ] headache [ ] dizziness [ ] syncope [ ] weakness [ ] numbness  Psychiatric: [ ] anxiety [ ] depression  Endocrine: [ ] diabetes [ ] thyroid problem  Hematologic/Lymphatic: [ ] anemia [ ] bleeding problem  Allergic/Immunologic: [ ] itchy eyes [ ] nasal discharge [ ] hives [ ] angioedema  [ ] All other systems negative  [ ] Unable to assess ROS because ________    OBJECTIVE:  ICU Vital Signs Last 24 Hrs  T(C): 37.8 (05 Apr 2020 00:00), Max: 38.6 (04 Apr 2020 08:00)  T(F): 100 (05 Apr 2020 00:00), Max: 101.4 (04 Apr 2020 08:00)  HR: 117 (05 Apr 2020 06:00) (91 - 129)  BP: --  BP(mean): --  ABP: 119/75 (05 Apr 2020 06:00) (85/54 - 145/75)  ABP(mean): 89 (05 Apr 2020 06:00) (64 - 99)  RR: 24 (05 Apr 2020 06:00) (24 - 24)  SpO2: 93% (05 Apr 2020 06:00) (90% - 94%)    Mode: AC/ CMV (Assist Control/ Continuous Mandatory Ventilation), RR (machine): 24, TV (machine): 500, FiO2: 70, PEEP: 10, ITime: 0.75, MAP: 15, PIP: 31    04-04 @ 07:01  -  04-05 @ 07:00  --------------------------------------------------------  IN: 428.4 mL / OUT: 1660 mL / NET: -1231.6 mL      CAPILLARY BLOOD GLUCOSE      POCT Blood Glucose.: 143 mg/dL (03 Apr 2020 17:52)      PHYSICAL EXAM:  GENERAL: NAD, well-developed  HEAD:  Atraumatic, Normocephalic  EYES: EOMI, PERRLA, conjunctiva and sclera clear  NECK: Supple, No JVD, Thyroid not palpable, non tender, Trachea midline  CHEST/LUNG: ( )ETT in place, ( )Tracheostomy in place, ( )no chest deformity,  ( )  Normal expansion/effort/palpation,  ( )Normal percussion/auscultation,  Clear to auscultation bilaterally; No wheeze  HEART: Regular rate and rhythm; No murmurs, rubs, or gallops, ( ) No JVD, ( )Normal Pulses, ( )Edema   ABDOMEN: Soft, Nontender, Nondistended; Bowel sounds presen, ( ) No Masses, (  ) No organomegaly,  (  ) Non-tender normal BS   : Montoya in Place, Voiding freely  Musculoskeletal/EXTREMITIES:(  ) Normal strength, movement, and tone, (  ) No focal atropy, (  ) Normal ROM, (  ) Normal digits and nails,  2+ Peripheral Pulses, No clubbing, cyanosis, or edema  PSYCH: AAOx3, (  ) Normal mood/affect/judgment/insight, (  ) Intact memory,   NEUROLOGY: non-focal, exam  SKIN: No rashes or lesions      LINES:    HOSPITAL MEDICATIONS:  MEDICATIONS  (STANDING):  anakinra Injectable 100 milliGRAM(s) SubCutaneous <User Schedule>  aspirin  chewable 81 milliGRAM(s) Oral daily  chlorhexidine 0.12% Liquid 15 milliLiter(s) Oral Mucosa every 12 hours  chlorhexidine 4% Liquid 1 Application(s) Topical <User Schedule>  dextrose 5%. 1000 milliLiter(s) (50 mL/Hr) IV Continuous <Continuous>  dextrose 50% Injectable 12.5 Gram(s) IV Push once  dextrose 50% Injectable 25 Gram(s) IV Push once  dextrose 50% Injectable 25 Gram(s) IV Push once  fentaNYL   Infusion. 0.5 MICROgram(s)/kG/Hr (2.04 mL/Hr) IV Continuous <Continuous>  midazolam Infusion 0.02 mG/kG/Hr (1.63 mL/Hr) IV Continuous <Continuous>  norepinephrine Infusion 0.05 MICROgram(s)/kG/Min (7.65 mL/Hr) IV Continuous <Continuous>  polyethylene glycol 3350 17 Gram(s) Oral daily  propofol Infusion 10.008 MICROgram(s)/kG/Min (4.9 mL/Hr) IV Continuous <Continuous>  rocuronium Infusion 4 MICROgram(s)/kG/Min (3.92 mL/Hr) IV Continuous <Continuous>  senna 2 Tablet(s) Oral at bedtime    MEDICATIONS  (PRN):  acetaminophen   Tablet .. 650 milliGRAM(s) Oral every 6 hours PRN Temp greater or equal to 38C (100.4F)  dextrose 40% Gel 15 Gram(s) Oral once PRN Blood Glucose LESS THAN 70 milliGRAM(s)/deciliter  glucagon  Injectable 1 milliGRAM(s) IntraMuscular once PRN Glucose LESS THAN 70 milligrams/deciliter  sodium chloride 0.65% Nasal 1 Spray(s) Both Nostrils every 8 hours PRN Nasal Congestion      LABS:                        13.7   10.57 )-----------( 162      ( 05 Apr 2020 01:06 )             42.4     Hgb Trend: 13.7<--, 12.6<--, 14.1<--, 13.9<--, 13.5<--  04-05    147<H>  |  104  |  25<H>  ----------------------------<  137<H>  5.0   |  31  |  0.83    Ca    8.4      05 Apr 2020 01:06  Phos  2.1     04-05  Mg     3.2     04-05    TPro  6.8  /  Alb  3.4  /  TBili  0.4  /  DBili  x   /  AST  61<H>  /  ALT  171<H>  /  AlkPhos  194<H>  04-05    Creatinine Trend: 0.83<--, 0.93<--, 0.62<--, 0.78<--, 0.82<--, 0.79<--  PT/INR - ( 04 Apr 2020 00:49 )   PT: 13.1 sec;   INR: 1.14 ratio             Arterial Blood Gas:  04-05 @ 00:59  7.42/56/70/35/94/9.0  ABG lactate: --  Arterial Blood Gas:  04-04 @ 16:54  7.36/61/62/34/92/6.9  ABG lactate: --  Arterial Blood Gas:  04-04 @ 11:11  7.33/67/111/34/98/6.2  ABG lactate: --  Arterial Blood Gas:  04-04 @ 00:47  7.32/66/151/33/99/5.6  ABG lactate: --  Arterial Blood Gas:  04-03 @ 15:21  7.28/68/118/31/98/2.9  ABG lactate: --  Arterial Blood Gas:  04-03 @ 09:24  7.24/75/149/31/98/1.7  ABG lactate: --        EKG:    MICROBIOLOGY:     RADIOLOGY:  [ ] Reviewed and interpreted by me    EKG:  MICROBIOLOGY:     Radiology: ***    Bedside lung ultrasound: ***    Bedside ECHO: ***    EKG:    CENTRAL LINE: Y/N          DATE INSERTED:              REMOVE: Y/N    MONTOYA: Y/N                        DATE INSERTED:              REMOVE: Y/N    A-LINE: Y/N                       DATE INSERTED:              REMOVE: Y/N    GLOBAL ISSUE/BEST PRACTICE:  Analgesia:  Sedation:  HOB elevation: yes  Stress ulcer prophylaxis:  VTE prophylaxis:  Glycemic control:  Nutrition:    CODE STATUS: ***  [facility] discussion: Y Patient is a 49y old  Male who presents with a chief complaint of fever , cough , shortness of breath (04 Apr 2020 15:10)      24 hour events:   -overnight increasing vent requirements   -pressors  capped overnight, family aware      REVIEW OF SYSTEMS:    [x] Unable to assess ROS because _intubated___    OBJECTIVE:  ICU Vital Signs Last 24 Hrs  T(C): 37.8 (05 Apr 2020 00:00), Max: 38.6 (04 Apr 2020 08:00)  T(F): 100 (05 Apr 2020 00:00), Max: 101.4 (04 Apr 2020 08:00)  HR: 117 (05 Apr 2020 06:00) (91 - 129)  BP: --  BP(mean): --  ABP: 119/75 (05 Apr 2020 06:00) (85/54 - 145/75)  ABP(mean): 89 (05 Apr 2020 06:00) (64 - 99)  RR: 24 (05 Apr 2020 06:00) (24 - 24)  SpO2: 93% (05 Apr 2020 06:00) (90% - 94%)    Mode: AC/ CMV (Assist Control/ Continuous Mandatory Ventilation), RR (machine): 24, TV (machine): 500, FiO2: 70, PEEP: 10, ITime: 0.75, MAP: 15, PIP: 31    04-04 @ 07:01  -  04-05 @ 07:00  --------------------------------------------------------  IN: 428.4 mL / OUT: 1660 mL / NET: -1231.6 mL      CAPILLARY BLOOD GLUCOSE      POCT Blood Glucose.: 143 mg/dL (03 Apr 2020 17:52)      PHYSICAL EXAM:  intubated, no eo, pupils 2mm sluggish, no corneal reflexes, flaccid x 4      LINES:    HOSPITAL MEDICATIONS:  MEDICATIONS  (STANDING):  anakinra Injectable 100 milliGRAM(s) SubCutaneous <User Schedule>  aspirin  chewable 81 milliGRAM(s) Oral daily  chlorhexidine 0.12% Liquid 15 milliLiter(s) Oral Mucosa every 12 hours  chlorhexidine 4% Liquid 1 Application(s) Topical <User Schedule>  dextrose 5%. 1000 milliLiter(s) (50 mL/Hr) IV Continuous <Continuous>  dextrose 50% Injectable 12.5 Gram(s) IV Push once  dextrose 50% Injectable 25 Gram(s) IV Push once  dextrose 50% Injectable 25 Gram(s) IV Push once  fentaNYL   Infusion. 0.5 MICROgram(s)/kG/Hr (2.04 mL/Hr) IV Continuous <Continuous>  midazolam Infusion 0.02 mG/kG/Hr (1.63 mL/Hr) IV Continuous <Continuous>  norepinephrine Infusion 0.05 MICROgram(s)/kG/Min (7.65 mL/Hr) IV Continuous <Continuous>  polyethylene glycol 3350 17 Gram(s) Oral daily  propofol Infusion 10.008 MICROgram(s)/kG/Min (4.9 mL/Hr) IV Continuous <Continuous>  rocuronium Infusion 4 MICROgram(s)/kG/Min (3.92 mL/Hr) IV Continuous <Continuous>  senna 2 Tablet(s) Oral at bedtime    MEDICATIONS  (PRN):  acetaminophen   Tablet .. 650 milliGRAM(s) Oral every 6 hours PRN Temp greater or equal to 38C (100.4F)  dextrose 40% Gel 15 Gram(s) Oral once PRN Blood Glucose LESS THAN 70 milliGRAM(s)/deciliter  glucagon  Injectable 1 milliGRAM(s) IntraMuscular once PRN Glucose LESS THAN 70 milligrams/deciliter  sodium chloride 0.65% Nasal 1 Spray(s) Both Nostrils every 8 hours PRN Nasal Congestion      LABS:                        13.7   10.57 )-----------( 162      ( 05 Apr 2020 01:06 )             42.4     Hgb Trend: 13.7<--, 12.6<--, 14.1<--, 13.9<--, 13.5<--  04-05    147<H>  |  104  |  25<H>  ----------------------------<  137<H>  5.0   |  31  |  0.83    Ca    8.4      05 Apr 2020 01:06  Phos  2.1     04-05  Mg     3.2     04-05    TPro  6.8  /  Alb  3.4  /  TBili  0.4  /  DBili  x   /  AST  61<H>  /  ALT  171<H>  /  AlkPhos  194<H>  04-05    Creatinine Trend: 0.83<--, 0.93<--, 0.62<--, 0.78<--, 0.82<--, 0.79<--  PT/INR - ( 04 Apr 2020 00:49 )   PT: 13.1 sec;   INR: 1.14 ratio             Arterial Blood Gas:  04-05 @ 00:59  7.42/56/70/35/94/9.0  ABG lactate: --  Arterial Blood Gas:  04-04 @ 16:54  7.36/61/62/34/92/6.9  ABG lactate: --  Arterial Blood Gas:  04-04 @ 11:11  7.33/67/111/34/98/6.2  ABG lactate: --  Arterial Blood Gas:  04-04 @ 00:47  7.32/66/151/33/99/5.6  ABG lactate: --  Arterial Blood Gas:  04-03 @ 15:21  7.28/68/118/31/98/2.9  ABG lactate: --  Arterial Blood Gas:  04-03 @ 09:24  7.24/75/149/31/98/1.7  ABG lactate: --        EKG:    MICROBIOLOGY:     RADIOLOGY:  [x] Reviewed and interpreted by me    EKG:  MICROBIOLOGY:     Radiology: Redemonstration, involving ischemia, mass effect, loss gray-white   distinction in bilateral cerebellar hemispheres and posterior cerebral   artery territories.   Poorly delineated right vertebral artery, with multifocal narrowing,   concerning for thrombosis and/or dissection, vessel not well seen above C1   level and entering dural ring, and extending to vertebrobasilar junction.   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     Partially seen left internal jugular catheter with thrombus. Multiple   partially visualized groundglass opacification in the lungs consistent with   COVID19. Portion of the enteric tube is coiled within the oral cavity.     Code Status: DNR

## 2020-04-05 NOTE — PROGRESS NOTE ADULT - ATTENDING COMMENTS
1.Acute hypoxemic respiratory failure due to SARS ARDS corona virus pneumonia. Pt is proned. Hypercapnic. Plateau pressures low Increase VT to 460. Plateau  26. Repeat ABG. Portable  CT head with ? artifact. In am will supine pt and if tolerated will sent for CT head  in radiology. Hold  Lovenox for now. Continue steroids and anikinra
Agree with above. COVID with ARDS. Supportive care. Seen and examined with team. 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 Critically ill with poor prognosis. Explained to family that there is no hope for functional recovery given the extent of edema seen on CT scan.
Adilene Chavarria  Pager: [phone]. If no response or past 5 pm call [phone].
1.Acute hypoxemic respiratory failure due to SARS ARDS corona virus pneumonia. Pt  tolerating supine position today. Increase VT to 500mls and decrease peep to 10cm h20. Continue steroids and anikinra.    2. Neuro. repeat CT head with massive vasogenic edema. Neurosurgery consulted , For CTA head.  Stop paralytics and sedation to obtain neurologic exam. This appears to be a catastrophic event. Family has little e Pt DNR. Discussing comfort care.

## 2020-04-05 NOTE — CHART NOTE - NSCHARTNOTEFT_GEN_A_CORE
4/5/20    I spent 30 min today discussing the patient's course and prognosis with patient's wife and best friend at bedside. I discussed with them his imaging and lab results indicative of multi-organ failure, lack of neurological reflexes and lack of candidacy for intervention, and increasing vent/pressor requirements.     Family had already spoken to physicians the prior evening and was aware of poor prognosis. 4/5/20    I spent 30 min today discussing the patient's course and prognosis with patient's wife and best friend at bedside. I discussed with them his imaging and lab results indicative of multi-organ failure, lack of neurological reflexes and lack of candidacy for intervention, and increasing vent/pressor requirements.     Family had already spoken to physicians the prior evening and was aware of poor prognosis. Due to patient's course, the decision was made today to make patient comfort measures only.

## 2020-04-06 NOTE — PROGRESS NOTE ADULT - ASSESSMENT
49 y.o. M w/ no significant PMHx p/w fevers, cough, myalgias, headache found to have acute hypoxic respiratory failure 2/2 COVID19, s/p intubation on 4/3. Family decided to make family comfort care 4/5.     #Neuro  - AxO x 3 at baseline, concern for delirium 2/2 unclear cause. DDx- microthrombi 2/2 COVID19 vs hyperammonemia vs hypercapnia, vs seizure vs stroke vs hyponatremia  - intubated, sedatives discontinued   - CTH given acute onset mental status changes - with some ?PCA territory finding  - repeat head CT 4/4 showing diffuse vasogenic edema with f/u urgent CTA head/neck showing possible PCA territory infarct bilaterally  - per neurosurgery - no intervention to be made at this time, grim prognosis   - continue aspirin    #Resp  - Acute Hypoxic Respiratory failure 2/2 COVID19 (possible terminal bronchial obstruction)- intubated on 4/3  - CTA - No pulmonary embolus to the level of the lobar arteries. Diffuse ground-glass opacities in both lungs with increased consolidations in the periphery, in keeping with known COVID-19 infection.   - no longer drawing labs, comfort care    #CV  - no active issues    #GI  - npo    #Transaminitis  - Likely 2/2 sepsis 2/2 COVID19      #ID  - Urine and Blood cx (3/29) - NGTD, Rapid Flu- negative and Legionella- negative  - COVID19 PCR positive- 3/29  - s/p Plaquenil, x 4 days, CTX/AZT (3/29). Per ID recs, does not meet criteria for IL-6 2/2 LFTs  - Anakinra discontinued d/t comfort measures   - Solumedrol discontinued d/t comfort measures     #Renal  - no active issues, monitor I/O  - Cr- 0.78 baseline  - holding diuresis as good uop without  - no further blood draws     #Heme  - H/H stable  - no active issues  - no further blood draws     #Endo  - no active issues    #DVT PPx  - no further blood draws     #GOC/Ethics-   - Now DNR (4/4)  - Now Comfort measures, pressors capped, medications halted (4/5 - )  - s/p family visit 4/5    - Emergency Contact- Jonas Mcleod= [phone]

## 2020-04-06 NOTE — PROGRESS NOTE ADULT - SUBJECTIVE AND OBJECTIVE BOX
INTERVAL HPI/OVERNIGHT EVENTS: Pt was made comfort care yesterday. Pressors (levo) are capped, off other drips. No longer drawing labs.    OBJECTIVE:  VITAL SIGNS:  ICU Vital Signs Last 24 Hrs  T(C): 37 (06 Apr 2020 07:00), Max: 39.7 (05 Apr 2020 13:45)  T(F): 98.6 (06 Apr 2020 07:00), Max: 103.4 (05 Apr 2020 13:45)  HR: 126 (06 Apr 2020 10:36) (110 - 126)  BP: --  BP(mean): --  ABP: 96/64 (06 Apr 2020 09:00) (95/65 - 114/71)  ABP(mean): 75 (06 Apr 2020 09:00) (73 - 86)  RR: 24 (06 Apr 2020 09:00) (24 - 24)  SpO2: 92% (06 Apr 2020 10:36) (91% - 96%)    Mode: AC/ CMV (Assist Control/ Continuous Mandatory Ventilation), RR (machine): 24, TV (machine): 500, FiO2: 80, PEEP: 10, ITime: 0.75, MAP: 15, PIP: 32    04-05 @ 07:01  -  04-06 @ 07:00  --------------------------------------------------------  IN: 515 mL / OUT: 1730 mL / NET: -1215 mL    04-06 @ 07:01  -  04-06 @ 11:04  --------------------------------------------------------  IN: 24 mL / OUT: 60 mL / NET: -36 mL      CAPILLARY BLOOD GLUCOSE    PHYSICAL EXAM:  General: intubated, sedated    MEDICATIONS:  MEDICATIONS  (STANDING):  chlorhexidine 0.12% Liquid 15 milliLiter(s) Oral Mucosa every 12 hours  chlorhexidine 4% Liquid 1 Application(s) Topical <User Schedule>  dextrose 5%. 1000 milliLiter(s) (50 mL/Hr) IV Continuous <Continuous>  dextrose 50% Injectable 12.5 Gram(s) IV Push once  dextrose 50% Injectable 25 Gram(s) IV Push once  dextrose 50% Injectable 25 Gram(s) IV Push once  norepinephrine Infusion 0.05 MICROgram(s)/kG/Min (7.65 mL/Hr) IV Continuous <Continuous>    MEDICATIONS  (PRN):  acetaminophen   Tablet .. 650 milliGRAM(s) Oral every 6 hours PRN Temp greater or equal to 38C (100.4F)  dextrose 40% Gel 15 Gram(s) Oral once PRN Blood Glucose LESS THAN 70 milliGRAM(s)/deciliter      ALLERGIES:  Allergies    No Known Drug Allergies  pollen (Rhinorrhea)    Intolerances    LABS:                        13.7   10.57 )-----------( 162      ( 05 Apr 2020 01:06 )             42.4     04-05    147<H>  |  104  |  25<H>  ----------------------------<  137<H>  5.0   |  31  |  0.83    Ca    8.4      05 Apr 2020 01:06  Phos  2.1     04-05  Mg     3.2     04-05    TPro  6.8  /  Alb  3.4  /  TBili  0.4  /  DBili  x   /  AST  61<H>  /  ALT  171<H>  /  AlkPhos  194<H>  04-05      RADIOLOGY & ADDITIONAL TESTS: Reviewed.

## 2020-04-07 NOTE — PROGRESS NOTE ADULT - REASON FOR ADMISSION
fever , cough , shortness of breath

## 2020-04-07 NOTE — CHART NOTE - NSCHARTNOTEFT_GEN_A_CORE
Death Note: Patient Asystole on monitor patient without HR or BP on a-line   On the exam.  Patient is unresponsive to verbal or noxious stimuli.  Pupils are fixed and dilated.  No spontaneous breathing.  Absent heart and breath sounds.  No palpable carotid and radial pulses.    Absent peripheral pulses.  Patient pronounced death at 0744. by myself Demetrio Jackson notified . Family notified wife [phone] Mrs Mcleod______      Graciela Jackson FNP, ANP-C,

## 2020-04-07 NOTE — DISCHARGE NOTE FOR THE EXPIRED PATIENT - HOSPITAL COURSE
49 y.o. M w/ no significant PMHx p/w fevers, cough, myalgias, headache found to have acute hypoxic respiratory failure 2/2 COVID19, s/p intubation on 4/3. Family decided to make family comfort care 4/5.   #Neuro  - AxO x 3 at baseline, concern for delirium 2/2 unclear cause. DDx- microthrombi 2/2 COVID19 vs hyperammonemia vs hypercapnia, vs seizure vs stroke vs hyponatremia  - intubated, sedatives discontinued   - CTH given acute onset mental status changes - with some ?PCA territory finding  - repeat head CT 4/4 showing diffuse vasogenic edema with f/u urgent CTA head/neck showing possible PCA territory infarct bilaterally  - per neurosurgery - no intervention to be made at this time, grim prognosis   - continue aspirin    #Resp  - Acute Hypoxic Respiratory failure 2/2 COVID19 (possible terminal bronchial obstruction)- intubated on 4/3  - CTA - No pulmonary embolus to the level of the lobar arteries. Diffuse ground-glass opacities in both lungs with increased consolidations in the periphery, in keeping with known COVID-19 infection.   - no longer drawing labs, comfort care    #CV  - no active issues    #GI  - npo    #Transaminitis  - Likely 2/2 sepsis 2/2 COVID19      #ID  - Urine and Blood cx (3/29) - NGTD, Rapid Flu- negative and Legionella- negative  - COVID19 PCR positive- 3/29  - s/p Plaquenil, x 4 days, CTX/AZT (3/29). Per ID recs, does not meet criteria for IL-6 2/2 LFTs  - Anakinra discontinued d/t comfort measures   - Solumedrol discontinued d/t comfort measures     #Renal  - no active issues, monitor I/O  - Cr- 0.78 baseline  - holding diuresis as good uop without  - no further blood draws     #Heme  - H/H stable  - no active issues  - no further blood draws     #Endo  - no active issues    #DVT PPx  - no further blood draws     #GOC/Ethics-   - Now DNR (4/4)  - Now Comfort measures, pressors capped, medications halted (4/5 - )  - s/p family visit 4/5    - Emergency Contact- Jonas Mcleod= [phone]

## 2020-04-07 NOTE — PROGRESS NOTE ADULT - SUBJECTIVE AND OBJECTIVE BOX
INTERVAL HPI/OVERNIGHT EVENTS:    OBJECTIVE:  VITAL SIGNS:  ICU Vital Signs Last 24 Hrs  T(C): 39.1 (06 Apr 2020 15:00), Max: 39.1 (06 Apr 2020 15:00)  T(F): 102.3 (06 Apr 2020 15:00), Max: 102.3 (06 Apr 2020 15:00)  HR: 144 (07 Apr 2020 04:00) (125 - 144)  BP: 96/55 (06 Apr 2020 15:45) (96/55 - 96/55)  BP(mean): 72 (06 Apr 2020 15:45) (72 - 72)  ABP: 81/60 (07 Apr 2020 04:00) (75/50 - 100/66)  ABP(mean): 68 (07 Apr 2020 04:00) (60 - 78)  RR: 34 (07 Apr 2020 04:00) (24 - 34)  SpO2: 93% (07 Apr 2020 04:00) (91% - 95%)    Mode: AC/ CMV (Assist Control/ Continuous Mandatory Ventilation), RR (machine): 34, TV (machine): 500, FiO2: 80, PEEP: 10, ITime: 0.75, MAP: 16, PIP: 33    04-06 @ 07:01  -  04-07 @ 07:00  --------------------------------------------------------  IN: 252 mL / OUT: 990 mL / NET: -738 mL      CAPILLARY BLOOD GLUCOSE    PHYSICAL EXAM:  General: intubated    MEDICATIONS:  MEDICATIONS  (STANDING):  chlorhexidine 0.12% Liquid 15 milliLiter(s) Oral Mucosa every 12 hours  chlorhexidine 4% Liquid 1 Application(s) Topical <User Schedule>  dextrose 5%. 1000 milliLiter(s) (50 mL/Hr) IV Continuous <Continuous>  dextrose 50% Injectable 12.5 Gram(s) IV Push once  dextrose 50% Injectable 25 Gram(s) IV Push once  dextrose 50% Injectable 25 Gram(s) IV Push once  norepinephrine Infusion 0.05 MICROgram(s)/kG/Min (7.65 mL/Hr) IV Continuous <Continuous>    MEDICATIONS  (PRN):  acetaminophen   Tablet .. 650 milliGRAM(s) Oral every 6 hours PRN Temp greater or equal to 38C (100.4F)  dextrose 40% Gel 15 Gram(s) Oral once PRN Blood Glucose LESS THAN 70 milliGRAM(s)/deciliter      ALLERGIES:  No Known Drug Allergies  pollen (Rhinorrhea)    Intolerances        LABS:  no am labs    RADIOLOGY & ADDITIONAL TESTS: Reviewed. INTERVAL HPI/OVERNIGHT EVENTS: Pt  at 7:45 am. Family notified.     OBJECTIVE:  VITAL SIGNS:  ICU Vital Signs Last 24 Hrs  T(C): 39.1 (2020 15:00), Max: 39.1 (2020 15:00)  T(F): 102.3 (2020 15:00), Max: 102.3 (2020 15:00)  HR: 144 (2020 04:00) (125 - 144)  BP: 96/55 (2020 15:45) (96/55 - 96/55)  BP(mean): 72 (2020 15:45) (72 - 72)  ABP: 81/60 (2020 04:00) (75/50 - 100/66)  ABP(mean): 68 (2020 04:00) (60 - 78)  RR: 34 (2020 04:00) (24 - 34)  SpO2: 93% (2020 04:00) (91% - 95%)    Mode: AC/ CMV (Assist Control/ Continuous Mandatory Ventilation), RR (machine): 34, TV (machine): 500, FiO2: 80, PEEP: 10, ITime: 0.75, MAP: 16, PIP: 33    04-06 @ 07:01  -  -07 @ 07:00  --------------------------------------------------------  IN: 252 mL / OUT: 990 mL / NET: -738 mL      CAPILLARY BLOOD GLUCOSE      MEDICATIONS:  MEDICATIONS  (STANDING):  chlorhexidine 0.12% Liquid 15 milliLiter(s) Oral Mucosa every 12 hours  chlorhexidine 4% Liquid 1 Application(s) Topical <User Schedule>  dextrose 5%. 1000 milliLiter(s) (50 mL/Hr) IV Continuous <Continuous>  dextrose 50% Injectable 12.5 Gram(s) IV Push once  dextrose 50% Injectable 25 Gram(s) IV Push once  dextrose 50% Injectable 25 Gram(s) IV Push once  norepinephrine Infusion 0.05 MICROgram(s)/kG/Min (7.65 mL/Hr) IV Continuous <Continuous>    MEDICATIONS  (PRN):  acetaminophen   Tablet .. 650 milliGRAM(s) Oral every 6 hours PRN Temp greater or equal to 38C (100.4F)  dextrose 40% Gel 15 Gram(s) Oral once PRN Blood Glucose LESS THAN 70 milliGRAM(s)/deciliter      ALLERGIES:  No Known Drug Allergies  pollen (Rhinorrhea)    Intolerances      LABS:  no am labs    RADIOLOGY & ADDITIONAL TESTS: Reviewed.

## 2020-04-07 NOTE — PROGRESS NOTE ADULT - ASSESSMENT
49 y.o. M w/ no significant PMHx p/w fevers, cough, myalgias, headache found to have acute hypoxic respiratory failure 2/2 COVID19, s/p intubation on 4/3. Family decided to make family comfort care 4/5.     #Neuro  - AxO x 3 at baseline, concern for delirium 2/2 unclear cause. DDx- microthrombi 2/2 COVID19 vs hyperammonemia vs hypercapnia, vs seizure vs stroke vs hyponatremia  - intubated, sedatives discontinued   - CTH given acute onset mental status changes - with some ?PCA territory finding  - repeat head CT 4/4 showing diffuse vasogenic edema with f/u urgent CTA head/neck showing possible PCA territory infarct bilaterally  - per neurosurgery - no intervention to be made at this time, grim prognosis   - continue aspirin    #Resp  - Acute Hypoxic Respiratory failure 2/2 COVID19 (possible terminal bronchial obstruction)- intubated on 4/3  - CTA - No pulmonary embolus to the level of the lobar arteries. Diffuse ground-glass opacities in both lungs with increased consolidations in the periphery, in keeping with known COVID-19 infection.   - no longer drawing labs, comfort care    #CV  - no active issues    #GI  - npo    #Transaminitis  - Likely 2/2 sepsis 2/2 COVID19      #ID  - Urine and Blood cx (3/29) - NGTD, Rapid Flu- negative and Legionella- negative  - COVID19 PCR positive- 3/29  - s/p Plaquenil, x 4 days, CTX/AZT (3/29). Per ID recs, does not meet criteria for IL-6 2/2 LFTs  - Anakinra discontinued d/t comfort measures   - Solumedrol discontinued d/t comfort measures     #Renal  - no active issues, monitor I/O  - Cr- 0.78 baseline  - holding diuresis as good uop without  - no further blood draws     #Heme  - H/H stable  - no active issues  - no further blood draws     #Endo  - no active issues    #DVT PPx  - no further blood draws     #GOC/Ethics-   - Now DNR (4/4)  - Now Comfort measures, pressors capped, medications halted (4/5 - )  - s/p family visit 4/5    - Emergency Contact- Jonas Mcleod= [phone] 49 y.o. M w/ no significant PMHx p/w fevers, cough, myalgias, headache found to have acute hypoxic respiratory failure / COVID19, s/p intubation on 4/3. Family decided to make family comfort care .     Pt went into asystole and  at 7:45 am.    - Emergency Contact- Jonas Tenaentes= [phone]    Corrie Shell PGY2

## 2022-03-08 NOTE — CONSULT NOTE ADULT - ATTENDING COMMENTS
Adilene Chavarria  Pager: [phone]. If no response or past 5 pm call [phone].
08-Mar-2022 14:36
Agree with above.  For now, neurologic exam deferred as will not contribute significantly to impression or recommendations.  Impression.  Hypoxia and change in mental status in COVID positive patient.  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  The imaging findings are ambiguous and difficult to interpret.  Imaging findings should be clarified if possible, and a few steps may be taken as a precaution in the meantime.  Suggest.  If feasible, MRI brain/MRA neck and head; if MR imaging not feasible, then conventional CT head/CTA neck and head; if conventional CT not feasible, then repeat portable CT head; telemetry; obtain TTE; start aspirin 81 mg daily ASAP if no contraindication; further management will be guided by results of this limited work-up.

## 2023-05-31 NOTE — ASU PREOP CHECKLIST - BP NONINVASIVE DIASTOLIC (MM HG)
66 Detail Level: Detailed Body Location Override (Optional - Billing Will Still Be Based On Selected Body Map Location If Applicable): left medial heel X Size Of Lesion In Cm (Optional): 0 Incorporate Mauc In Note: Yes

## 2025-03-24 NOTE — ASU PATIENT PROFILE, ADULT - ANESTHESIA, PREVIOUS REACTION, PROFILE
[FreeTextEntry1] : He presents for follow-up of the left shoulder.  Essentially no improvement since the last visit.  I believe he is approaching maximal medical improvement.  I would like to try 1 more 6-week course of physical therapy as an attempt at regaining a bit more rotator cuff strength.  He showed me the report from the [facility] visit from earlier in the month.  The physical examination portion states full active range of motion and 5 out of 5 strength with rotator cuff testing which in my opinion is not accurate.  It would be almost impossible for a 65-year-old male who sustained a traumatic dislocation with massive rotator cuff tearing after repair to have a "normal" exam as suggested in this report.  It is my opinion that he still has a fairly significant disability to the shoulder and unless he finds a position where there is sedentary work predominantly (with minimal lifting), he would not be able to go back to work otherwise.   none